# Patient Record
Sex: FEMALE | Race: WHITE | NOT HISPANIC OR LATINO | Employment: FULL TIME | URBAN - METROPOLITAN AREA
[De-identification: names, ages, dates, MRNs, and addresses within clinical notes are randomized per-mention and may not be internally consistent; named-entity substitution may affect disease eponyms.]

---

## 2018-07-19 ENCOUNTER — HOSPITAL ENCOUNTER (EMERGENCY)
Facility: HOSPITAL | Age: 63
Discharge: HOME | End: 2018-07-19
Attending: EMERGENCY MEDICINE
Payer: COMMERCIAL

## 2018-07-19 VITALS
SYSTOLIC BLOOD PRESSURE: 137 MMHG | HEIGHT: 67 IN | TEMPERATURE: 98 F | OXYGEN SATURATION: 94 % | RESPIRATION RATE: 18 BRPM | DIASTOLIC BLOOD PRESSURE: 71 MMHG | WEIGHT: 180 LBS | HEART RATE: 84 BPM | BODY MASS INDEX: 28.25 KG/M2

## 2018-07-19 DIAGNOSIS — R10.9 LEFT FLANK PAIN: Primary | ICD-10-CM

## 2018-07-19 LAB
BACTERIA #/AREA URNS HPF: NORMAL /HPF
BILIRUB UR QL STRIP: NEGATIVE
CLARITY UR: ABNORMAL
COLOR UR: YELLOW
GLUCOSE UR QL STRIP: NEGATIVE
HGB UR QL STRIP: NEGATIVE
KETONES UR QL STRIP: NEGATIVE
LEUKOCYTE ESTERASE UR QL STRIP: ABNORMAL
MICROSCOPIC COMMENT: NORMAL
NITRITE UR QL STRIP: NEGATIVE
PH UR STRIP: 5 [PH] (ref 5–8)
PROT UR QL STRIP: NEGATIVE
RBC #/AREA URNS HPF: 1 /HPF (ref 0–4)
SP GR UR STRIP: 1.02 (ref 1–1.03)
SQUAMOUS #/AREA URNS HPF: 4 /HPF
URN SPEC COLLECT METH UR: ABNORMAL
UROBILINOGEN UR STRIP-ACNC: NEGATIVE EU/DL
WBC #/AREA URNS HPF: 5 /HPF (ref 0–5)

## 2018-07-19 PROCEDURE — 63600175 PHARM REV CODE 636 W HCPCS: Performed by: NURSE PRACTITIONER

## 2018-07-19 PROCEDURE — 99284 EMERGENCY DEPT VISIT MOD MDM: CPT | Mod: 25

## 2018-07-19 PROCEDURE — 81000 URINALYSIS NONAUTO W/SCOPE: CPT

## 2018-07-19 PROCEDURE — 96372 THER/PROPH/DIAG INJ SC/IM: CPT

## 2018-07-19 PROCEDURE — 87086 URINE CULTURE/COLONY COUNT: CPT

## 2018-07-19 PROCEDURE — 25000003 PHARM REV CODE 250: Performed by: NURSE PRACTITIONER

## 2018-07-19 RX ORDER — CEPHALEXIN 250 MG/1
500 CAPSULE ORAL
Status: DISCONTINUED | OUTPATIENT
Start: 2018-07-19 | End: 2018-07-19 | Stop reason: HOSPADM

## 2018-07-19 RX ORDER — CYCLOBENZAPRINE HCL 10 MG
10 TABLET ORAL EVERY 8 HOURS
Qty: 30 TABLET | Refills: 0 | Status: SHIPPED | OUTPATIENT
Start: 2018-07-19 | End: 2018-07-24

## 2018-07-19 RX ORDER — IBUPROFEN 600 MG/1
600 TABLET ORAL EVERY 6 HOURS PRN
Qty: 30 TABLET | Refills: 0 | Status: SHIPPED | OUTPATIENT
Start: 2018-07-19 | End: 2019-05-20

## 2018-07-19 RX ORDER — HYDROCODONE BITARTRATE AND ACETAMINOPHEN 5; 325 MG/1; MG/1
1 TABLET ORAL
Status: COMPLETED | OUTPATIENT
Start: 2018-07-19 | End: 2018-07-19

## 2018-07-19 RX ORDER — CEPHALEXIN 500 MG/1
500 CAPSULE ORAL EVERY 8 HOURS
Qty: 21 CAPSULE | Refills: 0 | Status: SHIPPED | OUTPATIENT
Start: 2018-07-19 | End: 2018-07-26

## 2018-07-19 RX ORDER — KETOROLAC TROMETHAMINE 30 MG/ML
15 INJECTION, SOLUTION INTRAMUSCULAR; INTRAVENOUS
Status: COMPLETED | OUTPATIENT
Start: 2018-07-19 | End: 2018-07-19

## 2018-07-19 RX ORDER — CYCLOBENZAPRINE HCL 10 MG
10 TABLET ORAL
Status: COMPLETED | OUTPATIENT
Start: 2018-07-19 | End: 2018-07-19

## 2018-07-19 RX ADMIN — CYCLOBENZAPRINE HYDROCHLORIDE 10 MG: 10 TABLET, FILM COATED ORAL at 06:07

## 2018-07-19 RX ADMIN — HYDROCODONE BITARTRATE AND ACETAMINOPHEN 1 TABLET: 5; 325 TABLET ORAL at 06:07

## 2018-07-19 RX ADMIN — KETOROLAC TROMETHAMINE 15 MG: 30 INJECTION, SOLUTION INTRAMUSCULAR at 06:07

## 2018-07-19 NOTE — ED PROVIDER NOTES
Encounter Date: 7/19/2018  SORT:   64 y/o female presents for L flank pain with associated nausea since 1530 today. Denies history of similar episodes. Denies dysuria, hematuria, urinary urgency, vomiting, fever. Initial orders placed. Justine Agarwal PA-C   SCRIBE #1 NOTE: IIsrrael, am scribing for, and in the presence of,  Lul Gillette NP. I have scribed the following portions of the note - Other sections scribed: HPI and ROS.       History     Chief Complaint   Patient presents with    Back Pain     left lower back pain started at 3pm; with nausea; denies dysuria     Chief Complaint: Back Pain    HPI: This 63 y.o.female with no known PMHx presents to the ED c/o acute onset of left flank pain. Symptoms began at 3:30 pm today. Pain is constant, severe, and has worsened since onset. There's associated nausea and 1 episode of emesis. No attempted treatment. She denies fever, chills, dysuria or hematuria. No injury or trauma.       The history is provided by the patient. No  was used.     Review of patient's allergies indicates:  Allergies not on file  History reviewed. No pertinent past medical history.  History reviewed. No pertinent surgical history.  History reviewed. No pertinent family history.  Social History   Substance Use Topics    Smoking status: Never Smoker    Smokeless tobacco: Never Used    Alcohol use 0.6 oz/week     1 Cans of beer per week      Comment: occassionaly     Review of Systems   Constitutional: Negative for chills and fever.   HENT: Negative for ear pain and sore throat.    Eyes: Negative for pain.   Gastrointestinal: Positive for nausea and vomiting. Negative for abdominal pain and diarrhea.   Genitourinary: Negative for dysuria and hematuria.   Musculoskeletal: Positive for back pain. Negative for myalgias (arm or leg pain).   Skin: Negative for rash.   Neurological: Negative for headaches.   All other systems reviewed and are negative.      Physical  Exam     Initial Vitals [07/19/18 1714]   BP Pulse Resp Temp SpO2   (!) 179/83 74 20 97.7 °F (36.5 °C) 99 %      MAP       --         Physical Exam    Nursing note and vitals reviewed.  Constitutional: She appears well-developed and well-nourished. No distress.   HENT:   Head: Atraumatic.   Eyes: Conjunctivae are normal.   Neck: Neck supple.   Cardiovascular: Normal rate, regular rhythm and normal heart sounds.   No murmur heard.  Pulmonary/Chest: Breath sounds normal. No respiratory distress. She has no wheezes. She has no rhonchi. She has no rales.   Abdominal: Soft. Bowel sounds are normal. She exhibits no distension. There is CVA tenderness (Left).   Neurological: She is alert and oriented to person, place, and time. No sensory deficit.   Skin: Skin is warm and dry. No rash noted.         ED Course   Procedures  Labs Reviewed   URINALYSIS - Abnormal; Notable for the following:        Result Value    Appearance, UA Hazy (*)     Leukocytes, UA 3+ (*)     All other components within normal limits   CULTURE, URINE   URINALYSIS MICROSCOPIC          Imaging Results          CT Renal Stone Study ABD Pelvis WO (Final result)  Result time 07/19/18 18:48:11    Final result by Gavin Barajas MD (07/19/18 18:48:11)                 Impression:      Mild left-sided perinephric stranding with mild prominence of the left ureter; although no obstructing stone is seen this may represent the sequelae of a recently passed stone or infectious or inflammatory process; clinical correlation is advised      Electronically signed by: Gavin Barajas MD  Date:    07/19/2018  Time:    18:48             Narrative:    EXAMINATION:  CT RENAL STONE STUDY ABD PELVIS WO    CLINICAL HISTORY:  Flank pain, stone disease suspected;    TECHNIQUE:  Low dose axial images, sagittal and coronal reformations were obtained from the lung bases to the pubic symphysis.  Contrast was not administered.    COMPARISON:  None    FINDINGS:  Heart: Normal in  size. No pericardial effusion.    Lung Bases: Well aerated, without consolidation or pleural fluid.    Liver: There is diffuse hepatic steatosis    Gallbladder: No calcified gallstones.    Bile Ducts: No evidence of dilated ducts.    Pancreas: No mass or peripancreatic fat stranding.    Spleen: Unremarkable.    Adrenals: Unremarkable.    Kidneys/ Ureters: There is no evidence of an renal calculi.  There is mild perinephric stranding on the left.  There is mild prominence of the left-sided ureter bed no evidence of hydronephrosis or hydroureter on the right.    Bladder: No evidence of wall thickening.    Reproductive organs: Unremarkable.    GI Tract/Mesentery: No evidence of bowel obstruction or inflammation.    Peritoneal Space: No ascites. No free air.    Retroperitoneum: No significant adenopathy.    Abdominal wall: Unremarkable.    Vasculature: No significant atherosclerosis or aneurysm.    Bones: No acute fracture.                                 Medical Decision Making:   ED Management:  63-year-old female presents to the ED for evaluation of left flank pain that began approximately 2-3 hours prior to arrival.    denies any known injury.  Denies fever, chills, dysuria or hematuria.  No treatment prior to arrival.  Denies history of similar symptoms.  On exam patient appears uncomfortable but nontoxic appearing.  Abdomen is soft and nondistended.  She does have left CVA tenderness.  UA remarkable for 3+ leukocytes with 5 WBCs and 1 RBC.  CT of abdomen and pelvis without contrast remarkable for left perinephric stranding and mild prominence of the left ureter.  No evidence of kidney stones.  The radiologist noted this can be seen with recently passed stone or infection.  Patient treated with Toradol, Flexeril and Norco with improvement in presenting symptoms.  She is discharged with prescription for ibuprofen, Flexeril and Keflex and instructed to follow-up with her PCP for further evaluation of presenting  symptoms.  She is encouraged to return to the ED with any new or worsening symptoms or any concerns.              Scribe Attestation:   Scribe #1: I performed the above scribed service and the documentation accurately describes the services I performed. I attest to the accuracy of the note.    Attending Attestation:           Physician Attestation for Scribe:  Physician Attestation Statement for Scribe #1: I, Lul Gillette NP, reviewed documentation, as scribed by Isrrael Higginbotham in my presence, and it is both accurate and complete.                    Clinical Impression:   The encounter diagnosis was Left flank pain.                             Lul Gillette NP  07/19/18 2110

## 2018-07-19 NOTE — ED TRIAGE NOTES
"Pt states " My back hurting lower left side and now nausea and vomiting."  Back been hurting since 1530 and just now I started throwing up 1725.  The pain to back 8/10 sharp and achy.  "

## 2018-07-20 NOTE — DISCHARGE INSTRUCTIONS
Take medication as prescribed. Drink plenty of water. Follow up with your primary care provider for re-evaluation of presenting symptoms. Return to the ED with any new or worsening symptoms or any concerns.

## 2018-07-21 LAB — BACTERIA UR CULT: NORMAL

## 2019-05-20 ENCOUNTER — ANESTHESIA EVENT (OUTPATIENT)
Dept: SURGERY | Facility: HOSPITAL | Age: 64
DRG: 338 | End: 2019-05-20
Payer: COMMERCIAL

## 2019-05-20 ENCOUNTER — HOSPITAL ENCOUNTER (INPATIENT)
Facility: HOSPITAL | Age: 64
LOS: 8 days | Discharge: HOME | DRG: 338 | End: 2019-05-29
Attending: EMERGENCY MEDICINE | Admitting: SURGERY
Payer: COMMERCIAL

## 2019-05-20 ENCOUNTER — ANESTHESIA (OUTPATIENT)
Dept: SURGERY | Facility: HOSPITAL | Age: 64
DRG: 338 | End: 2019-05-20
Payer: COMMERCIAL

## 2019-05-20 DIAGNOSIS — R18.8 PELVIC FLUID COLLECTION: Primary | ICD-10-CM

## 2019-05-20 DIAGNOSIS — C18.1 ADENOCARCINOMA OF APPENDIX: ICD-10-CM

## 2019-05-20 DIAGNOSIS — R10.84 GENERALIZED ABDOMINAL PAIN: ICD-10-CM

## 2019-05-20 DIAGNOSIS — Z01.810 PREOP CARDIOVASCULAR EXAM: ICD-10-CM

## 2019-05-20 DIAGNOSIS — R50.9 FEVER, UNSPECIFIED FEVER CAUSE: ICD-10-CM

## 2019-05-20 DIAGNOSIS — K37 APPENDICITIS, UNSPECIFIED APPENDICITIS TYPE: ICD-10-CM

## 2019-05-20 DIAGNOSIS — K35.32 ACUTE PERFORATED APPENDICITIS: ICD-10-CM

## 2019-05-20 LAB
ABO + RH BLD: NORMAL
ALBUMIN SERPL BCP-MCNC: 3.7 G/DL (ref 3.5–5.2)
ALP SERPL-CCNC: 68 U/L (ref 55–135)
ALT SERPL W/O P-5'-P-CCNC: 30 U/L (ref 10–44)
ANION GAP SERPL CALC-SCNC: 8 MMOL/L (ref 8–16)
APTT BLDCRRT: 25.7 SEC (ref 21–32)
AST SERPL-CCNC: 27 U/L (ref 10–40)
BACTERIA #/AREA URNS HPF: ABNORMAL /HPF
BASOPHILS # BLD AUTO: 0.01 K/UL (ref 0–0.2)
BASOPHILS NFR BLD: 0.1 % (ref 0–1.9)
BILIRUB SERPL-MCNC: 0.3 MG/DL (ref 0.1–1)
BILIRUB UR QL STRIP: NEGATIVE
BLD GP AB SCN CELLS X3 SERPL QL: NORMAL
BUN SERPL-MCNC: 18 MG/DL (ref 8–23)
CALCIUM SERPL-MCNC: 10 MG/DL (ref 8.7–10.5)
CHLORIDE SERPL-SCNC: 106 MMOL/L (ref 95–110)
CLARITY UR: ABNORMAL
CO2 SERPL-SCNC: 25 MMOL/L (ref 23–29)
COLOR UR: YELLOW
CREAT SERPL-MCNC: 0.9 MG/DL (ref 0.5–1.4)
DIFFERENTIAL METHOD: NORMAL
EOSINOPHIL # BLD AUTO: 0.2 K/UL (ref 0–0.5)
EOSINOPHIL NFR BLD: 2.1 % (ref 0–8)
ERYTHROCYTE [DISTWIDTH] IN BLOOD BY AUTOMATED COUNT: 13.6 % (ref 11.5–14.5)
EST. GFR  (AFRICAN AMERICAN): >60 ML/MIN/1.73 M^2
EST. GFR  (NON AFRICAN AMERICAN): >60 ML/MIN/1.73 M^2
GLUCOSE SERPL-MCNC: 150 MG/DL (ref 70–110)
GLUCOSE UR QL STRIP: NEGATIVE
HCT VFR BLD AUTO: 42.4 % (ref 37–48.5)
HGB BLD-MCNC: 13.8 G/DL (ref 12–16)
HGB UR QL STRIP: NEGATIVE
INR PPP: 0.9 (ref 0.8–1.2)
KETONES UR QL STRIP: NEGATIVE
LACTATE SERPL-SCNC: 1.9 MMOL/L (ref 0.5–2.2)
LACTATE SERPL-SCNC: 3.5 MMOL/L (ref 0.5–2.2)
LEUKOCYTE ESTERASE UR QL STRIP: ABNORMAL
LIPASE SERPL-CCNC: 21 U/L (ref 4–60)
LYMPHOCYTES # BLD AUTO: 2.3 K/UL (ref 1–4.8)
LYMPHOCYTES NFR BLD: 22.5 % (ref 18–48)
MCH RBC QN AUTO: 29.4 PG (ref 27–31)
MCHC RBC AUTO-ENTMCNC: 32.5 G/DL (ref 32–36)
MCV RBC AUTO: 90 FL (ref 82–98)
MICROSCOPIC COMMENT: ABNORMAL
MONOCYTES # BLD AUTO: 0.5 K/UL (ref 0.3–1)
MONOCYTES NFR BLD: 4.7 % (ref 4–15)
NEUTROPHILS # BLD AUTO: 7.2 K/UL (ref 1.8–7.7)
NEUTROPHILS NFR BLD: 70.6 % (ref 38–73)
NITRITE UR QL STRIP: NEGATIVE
PH UR STRIP: 6 [PH] (ref 5–8)
PLATELET # BLD AUTO: 218 K/UL (ref 150–350)
PMV BLD AUTO: 10.9 FL (ref 9.2–12.9)
POTASSIUM SERPL-SCNC: 4.4 MMOL/L (ref 3.5–5.1)
PROT SERPL-MCNC: 6.9 G/DL (ref 6–8.4)
PROT UR QL STRIP: NEGATIVE
PROTHROMBIN TIME: 9.8 SEC (ref 9–12.5)
RBC # BLD AUTO: 4.69 M/UL (ref 4–5.4)
RBC #/AREA URNS HPF: 1 /HPF (ref 0–4)
SODIUM SERPL-SCNC: 139 MMOL/L (ref 136–145)
SP GR UR STRIP: 1.02 (ref 1–1.03)
SQUAMOUS #/AREA URNS HPF: 6 /HPF
URN SPEC COLLECT METH UR: ABNORMAL
UROBILINOGEN UR STRIP-ACNC: NEGATIVE EU/DL
WBC # BLD AUTO: 10.24 K/UL (ref 3.9–12.7)
WBC #/AREA URNS HPF: 13 /HPF (ref 0–5)

## 2019-05-20 PROCEDURE — 99285 EMERGENCY DEPT VISIT HI MDM: CPT | Mod: 25

## 2019-05-20 PROCEDURE — 36415 COLL VENOUS BLD VENIPUNCTURE: CPT

## 2019-05-20 PROCEDURE — S0030 INJECTION, METRONIDAZOLE: HCPCS | Performed by: SURGERY

## 2019-05-20 PROCEDURE — 93010 ELECTROCARDIOGRAM REPORT: CPT | Mod: ,,, | Performed by: INTERNAL MEDICINE

## 2019-05-20 PROCEDURE — 25000003 PHARM REV CODE 250: Performed by: SURGERY

## 2019-05-20 PROCEDURE — 93010 EKG 12-LEAD: ICD-10-PCS | Mod: ,,, | Performed by: INTERNAL MEDICINE

## 2019-05-20 PROCEDURE — 37000009 HC ANESTHESIA EA ADD 15 MINS: Performed by: SURGERY

## 2019-05-20 PROCEDURE — 63600175 PHARM REV CODE 636 W HCPCS: Performed by: EMERGENCY MEDICINE

## 2019-05-20 PROCEDURE — D9220A PRA ANESTHESIA: ICD-10-PCS | Mod: ANES,,, | Performed by: ANESTHESIOLOGY

## 2019-05-20 PROCEDURE — S0030 INJECTION, METRONIDAZOLE: HCPCS | Performed by: EMERGENCY MEDICINE

## 2019-05-20 PROCEDURE — 85025 COMPLETE CBC W/AUTO DIFF WBC: CPT

## 2019-05-20 PROCEDURE — 25000003 PHARM REV CODE 250: Performed by: EMERGENCY MEDICINE

## 2019-05-20 PROCEDURE — 27201423 OPTIME MED/SURG SUP & DEVICES STERILE SUPPLY: Performed by: SURGERY

## 2019-05-20 PROCEDURE — 25000003 PHARM REV CODE 250: Performed by: NURSE ANESTHETIST, CERTIFIED REGISTERED

## 2019-05-20 PROCEDURE — 63600175 PHARM REV CODE 636 W HCPCS: Performed by: NURSE ANESTHETIST, CERTIFIED REGISTERED

## 2019-05-20 PROCEDURE — D9220A PRA ANESTHESIA: ICD-10-PCS | Mod: CRNA,,, | Performed by: NURSE ANESTHETIST, CERTIFIED REGISTERED

## 2019-05-20 PROCEDURE — 83690 ASSAY OF LIPASE: CPT

## 2019-05-20 PROCEDURE — D9220A PRA ANESTHESIA: Mod: ANES,,, | Performed by: ANESTHESIOLOGY

## 2019-05-20 PROCEDURE — 87040 BLOOD CULTURE FOR BACTERIA: CPT | Mod: 59

## 2019-05-20 PROCEDURE — 85610 PROTHROMBIN TIME: CPT

## 2019-05-20 PROCEDURE — 87086 URINE CULTURE/COLONY COUNT: CPT

## 2019-05-20 PROCEDURE — 36000708 HC OR TIME LEV III 1ST 15 MIN: Performed by: SURGERY

## 2019-05-20 PROCEDURE — 81000 URINALYSIS NONAUTO W/SCOPE: CPT

## 2019-05-20 PROCEDURE — 96375 TX/PRO/DX INJ NEW DRUG ADDON: CPT

## 2019-05-20 PROCEDURE — 94761 N-INVAS EAR/PLS OXIMETRY MLT: CPT

## 2019-05-20 PROCEDURE — 83605 ASSAY OF LACTIC ACID: CPT

## 2019-05-20 PROCEDURE — 36000709 HC OR TIME LEV III EA ADD 15 MIN: Performed by: SURGERY

## 2019-05-20 PROCEDURE — D9220A PRA ANESTHESIA: Mod: CRNA,,, | Performed by: NURSE ANESTHETIST, CERTIFIED REGISTERED

## 2019-05-20 PROCEDURE — 37000008 HC ANESTHESIA 1ST 15 MINUTES: Performed by: SURGERY

## 2019-05-20 PROCEDURE — 88307 TISSUE SPECIMEN TO PATHOLOGY - SURGERY: ICD-10-PCS | Mod: 26,,, | Performed by: PATHOLOGY

## 2019-05-20 PROCEDURE — 85730 THROMBOPLASTIN TIME PARTIAL: CPT

## 2019-05-20 PROCEDURE — 88307 TISSUE EXAM BY PATHOLOGIST: CPT | Performed by: PATHOLOGY

## 2019-05-20 PROCEDURE — 93005 ELECTROCARDIOGRAM TRACING: CPT

## 2019-05-20 PROCEDURE — 96374 THER/PROPH/DIAG INJ IV PUSH: CPT

## 2019-05-20 PROCEDURE — 86850 RBC ANTIBODY SCREEN: CPT

## 2019-05-20 PROCEDURE — 71000033 HC RECOVERY, INTIAL HOUR: Performed by: SURGERY

## 2019-05-20 PROCEDURE — 63600175 PHARM REV CODE 636 W HCPCS: Performed by: SURGERY

## 2019-05-20 PROCEDURE — 80053 COMPREHEN METABOLIC PANEL: CPT

## 2019-05-20 PROCEDURE — 71000039 HC RECOVERY, EACH ADD'L HOUR: Performed by: SURGERY

## 2019-05-20 PROCEDURE — 88307 TISSUE EXAM BY PATHOLOGIST: CPT | Mod: 26,,, | Performed by: PATHOLOGY

## 2019-05-20 RX ORDER — SODIUM CHLORIDE 9 MG/ML
INJECTION, SOLUTION INTRAVENOUS CONTINUOUS PRN
Status: DISCONTINUED | OUTPATIENT
Start: 2019-05-20 | End: 2019-05-20

## 2019-05-20 RX ORDER — FAMOTIDINE 20 MG/1
20 TABLET, FILM COATED ORAL 2 TIMES DAILY
Status: DISCONTINUED | OUTPATIENT
Start: 2019-05-20 | End: 2019-05-29 | Stop reason: HOSPADM

## 2019-05-20 RX ORDER — ACETAMINOPHEN 650 MG/1
650 SUPPOSITORY RECTAL
Status: COMPLETED | OUTPATIENT
Start: 2019-05-20 | End: 2019-05-20

## 2019-05-20 RX ORDER — LIDOCAINE HCL/PF 100 MG/5ML
SYRINGE (ML) INTRAVENOUS
Status: DISCONTINUED | OUTPATIENT
Start: 2019-05-20 | End: 2019-05-20

## 2019-05-20 RX ORDER — NEOSTIGMINE METHYLSULFATE 1 MG/ML
INJECTION, SOLUTION INTRAVENOUS
Status: DISCONTINUED | OUTPATIENT
Start: 2019-05-20 | End: 2019-05-20

## 2019-05-20 RX ORDER — PROPOFOL 10 MG/ML
VIAL (ML) INTRAVENOUS
Status: DISCONTINUED | OUTPATIENT
Start: 2019-05-20 | End: 2019-05-20

## 2019-05-20 RX ORDER — HYDROCODONE BITARTRATE AND ACETAMINOPHEN 10; 325 MG/1; MG/1
1 TABLET ORAL EVERY 4 HOURS PRN
Status: DISCONTINUED | OUTPATIENT
Start: 2019-05-20 | End: 2019-05-29 | Stop reason: HOSPADM

## 2019-05-20 RX ORDER — IBUPROFEN 400 MG/1
400 TABLET ORAL EVERY 6 HOURS PRN
Status: DISCONTINUED | OUTPATIENT
Start: 2019-05-20 | End: 2019-05-29 | Stop reason: HOSPADM

## 2019-05-20 RX ORDER — HYDROMORPHONE HYDROCHLORIDE 2 MG/ML
1 INJECTION, SOLUTION INTRAMUSCULAR; INTRAVENOUS; SUBCUTANEOUS
Status: COMPLETED | OUTPATIENT
Start: 2019-05-20 | End: 2019-05-20

## 2019-05-20 RX ORDER — CIPROFLOXACIN 2 MG/ML
400 INJECTION, SOLUTION INTRAVENOUS
Status: DISCONTINUED | OUTPATIENT
Start: 2019-05-20 | End: 2019-05-26

## 2019-05-20 RX ORDER — HYDROMORPHONE HYDROCHLORIDE 2 MG/ML
1 INJECTION, SOLUTION INTRAMUSCULAR; INTRAVENOUS; SUBCUTANEOUS
Status: DISCONTINUED | OUTPATIENT
Start: 2019-05-20 | End: 2019-05-29 | Stop reason: HOSPADM

## 2019-05-20 RX ORDER — SODIUM CHLORIDE 0.9 % (FLUSH) 0.9 %
3 SYRINGE (ML) INJECTION
Status: DISCONTINUED | OUTPATIENT
Start: 2019-05-20 | End: 2019-05-20 | Stop reason: HOSPADM

## 2019-05-20 RX ORDER — SODIUM CHLORIDE, SODIUM LACTATE, POTASSIUM CHLORIDE, CALCIUM CHLORIDE 600; 310; 30; 20 MG/100ML; MG/100ML; MG/100ML; MG/100ML
INJECTION, SOLUTION INTRAVENOUS CONTINUOUS
Status: DISCONTINUED | OUTPATIENT
Start: 2019-05-20 | End: 2019-05-22

## 2019-05-20 RX ORDER — METRONIDAZOLE 500 MG/100ML
500 INJECTION, SOLUTION INTRAVENOUS
Status: COMPLETED | OUTPATIENT
Start: 2019-05-20 | End: 2019-05-20

## 2019-05-20 RX ORDER — PHENYLEPHRINE HYDROCHLORIDE 10 MG/ML
INJECTION INTRAVENOUS
Status: DISCONTINUED | OUTPATIENT
Start: 2019-05-20 | End: 2019-05-20

## 2019-05-20 RX ORDER — SODIUM CHLORIDE, SODIUM LACTATE, POTASSIUM CHLORIDE, CALCIUM CHLORIDE 600; 310; 30; 20 MG/100ML; MG/100ML; MG/100ML; MG/100ML
INJECTION, SOLUTION INTRAVENOUS CONTINUOUS PRN
Status: DISCONTINUED | OUTPATIENT
Start: 2019-05-20 | End: 2019-05-20

## 2019-05-20 RX ORDER — ONDANSETRON 2 MG/ML
8 INJECTION INTRAMUSCULAR; INTRAVENOUS
Status: COMPLETED | OUTPATIENT
Start: 2019-05-20 | End: 2019-05-20

## 2019-05-20 RX ORDER — GLYCOPYRROLATE 0.2 MG/ML
INJECTION INTRAMUSCULAR; INTRAVENOUS
Status: DISCONTINUED | OUTPATIENT
Start: 2019-05-20 | End: 2019-05-20

## 2019-05-20 RX ORDER — CIPROFLOXACIN 2 MG/ML
400 INJECTION, SOLUTION INTRAVENOUS
Status: COMPLETED | OUTPATIENT
Start: 2019-05-20 | End: 2019-05-20

## 2019-05-20 RX ORDER — METRONIDAZOLE 500 MG/100ML
500 INJECTION, SOLUTION INTRAVENOUS
Status: DISCONTINUED | OUTPATIENT
Start: 2019-05-20 | End: 2019-05-26

## 2019-05-20 RX ORDER — MIDAZOLAM HYDROCHLORIDE 1 MG/ML
INJECTION, SOLUTION INTRAMUSCULAR; INTRAVENOUS
Status: DISCONTINUED | OUTPATIENT
Start: 2019-05-20 | End: 2019-05-20

## 2019-05-20 RX ORDER — LORAZEPAM 2 MG/ML
0.25 INJECTION INTRAMUSCULAR ONCE AS NEEDED
Status: DISCONTINUED | OUTPATIENT
Start: 2019-05-20 | End: 2019-05-20 | Stop reason: HOSPADM

## 2019-05-20 RX ORDER — ENOXAPARIN SODIUM 100 MG/ML
40 INJECTION SUBCUTANEOUS EVERY 24 HOURS
Status: DISCONTINUED | OUTPATIENT
Start: 2019-05-21 | End: 2019-05-29 | Stop reason: HOSPADM

## 2019-05-20 RX ORDER — FENTANYL CITRATE 50 UG/ML
INJECTION, SOLUTION INTRAMUSCULAR; INTRAVENOUS
Status: DISCONTINUED | OUTPATIENT
Start: 2019-05-20 | End: 2019-05-20

## 2019-05-20 RX ORDER — SUCCINYLCHOLINE CHLORIDE 20 MG/ML
INJECTION INTRAMUSCULAR; INTRAVENOUS
Status: DISCONTINUED | OUTPATIENT
Start: 2019-05-20 | End: 2019-05-20

## 2019-05-20 RX ORDER — ROCURONIUM BROMIDE 10 MG/ML
INJECTION, SOLUTION INTRAVENOUS
Status: DISCONTINUED | OUTPATIENT
Start: 2019-05-20 | End: 2019-05-20

## 2019-05-20 RX ORDER — BUPIVACAINE HYDROCHLORIDE AND EPINEPHRINE 2.5; 5 MG/ML; UG/ML
INJECTION, SOLUTION EPIDURAL; INFILTRATION; INTRACAUDAL; PERINEURAL
Status: DISCONTINUED | OUTPATIENT
Start: 2019-05-20 | End: 2019-05-20 | Stop reason: HOSPADM

## 2019-05-20 RX ORDER — MORPHINE SULFATE 10 MG/ML
2 INJECTION INTRAMUSCULAR; INTRAVENOUS; SUBCUTANEOUS EVERY 5 MIN PRN
Status: DISCONTINUED | OUTPATIENT
Start: 2019-05-20 | End: 2019-05-20 | Stop reason: HOSPADM

## 2019-05-20 RX ADMIN — HYDROMORPHONE HYDROCHLORIDE 1 MG: 2 INJECTION, SOLUTION INTRAMUSCULAR; INTRAVENOUS; SUBCUTANEOUS at 06:05

## 2019-05-20 RX ADMIN — PHENYLEPHRINE HYDROCHLORIDE 100 MCG: 10 INJECTION INTRAVENOUS at 09:05

## 2019-05-20 RX ADMIN — ROCURONIUM BROMIDE 5 MG: 10 INJECTION, SOLUTION INTRAVENOUS at 09:05

## 2019-05-20 RX ADMIN — ROCURONIUM BROMIDE 10 MG: 10 INJECTION, SOLUTION INTRAVENOUS at 09:05

## 2019-05-20 RX ADMIN — CIPROFLOXACIN 400 MG: 2 INJECTION, SOLUTION INTRAVENOUS at 07:05

## 2019-05-20 RX ADMIN — FAMOTIDINE 20 MG: 20 TABLET ORAL at 08:05

## 2019-05-20 RX ADMIN — ONDANSETRON 8 MG: 2 INJECTION INTRAMUSCULAR; INTRAVENOUS at 06:05

## 2019-05-20 RX ADMIN — FENTANYL CITRATE 100 MCG: 50 INJECTION INTRAMUSCULAR; INTRAVENOUS at 09:05

## 2019-05-20 RX ADMIN — PROPOFOL 50 MG: 10 INJECTION, EMULSION INTRAVENOUS at 09:05

## 2019-05-20 RX ADMIN — METRONIDAZOLE 500 MG: 500 INJECTION, SOLUTION INTRAVENOUS at 09:05

## 2019-05-20 RX ADMIN — SODIUM CHLORIDE, SODIUM LACTATE, POTASSIUM CHLORIDE, AND CALCIUM CHLORIDE: .6; .31; .03; .02 INJECTION, SOLUTION INTRAVENOUS at 02:05

## 2019-05-20 RX ADMIN — ROCURONIUM BROMIDE 20 MG: 10 INJECTION, SOLUTION INTRAVENOUS at 09:05

## 2019-05-20 RX ADMIN — SODIUM CHLORIDE: 0.9 INJECTION, SOLUTION INTRAVENOUS at 08:05

## 2019-05-20 RX ADMIN — PHENYLEPHRINE HYDROCHLORIDE 200 MCG: 10 INJECTION INTRAVENOUS at 09:05

## 2019-05-20 RX ADMIN — METRONIDAZOLE 500 MG: 500 INJECTION, SOLUTION INTRAVENOUS at 05:05

## 2019-05-20 RX ADMIN — ACETAMINOPHEN 650 MG: 650 SUPPOSITORY RECTAL at 07:05

## 2019-05-20 RX ADMIN — MIDAZOLAM HYDROCHLORIDE 2 MG: 1 INJECTION, SOLUTION INTRAMUSCULAR; INTRAVENOUS at 09:05

## 2019-05-20 RX ADMIN — NEOSTIGMINE METHYLSULFATE 5 MG: 1 INJECTION INTRAVENOUS at 10:05

## 2019-05-20 RX ADMIN — SUCCINYLCHOLINE CHLORIDE 140 MG: 20 INJECTION, SOLUTION INTRAMUSCULAR; INTRAVENOUS at 09:05

## 2019-05-20 RX ADMIN — SODIUM CHLORIDE, SODIUM LACTATE, POTASSIUM CHLORIDE, AND CALCIUM CHLORIDE: .6; .31; .03; .02 INJECTION, SOLUTION INTRAVENOUS at 10:05

## 2019-05-20 RX ADMIN — GLYCOPYRROLATE 0.5 MG: 0.2 INJECTION, SOLUTION INTRAMUSCULAR; INTRAVENOUS at 10:05

## 2019-05-20 RX ADMIN — CIPROFLOXACIN 400 MG: 2 INJECTION, SOLUTION INTRAVENOUS at 08:05

## 2019-05-20 RX ADMIN — PROPOFOL 150 MG: 10 INJECTION, EMULSION INTRAVENOUS at 09:05

## 2019-05-20 RX ADMIN — LIDOCAINE HYDROCHLORIDE 100 MG: 20 INJECTION, SOLUTION INTRAVENOUS at 09:05

## 2019-05-20 RX ADMIN — SODIUM CHLORIDE 2517 ML: 0.9 INJECTION, SOLUTION INTRAVENOUS at 07:05

## 2019-05-20 NOTE — TRANSFER OF CARE
"Anesthesia Transfer of Care Note    Patient: Ny Romeo    Procedure(s) Performed: Procedure(s) (LRB):  APPENDECTOMY, LAPAROSCOPIC (N/A)    Patient location: PACU    Anesthesia Type: general    Transport from OR: Transported from OR on room air with adequate spontaneous ventilation    Post pain: adequate analgesia    Post assessment: no apparent anesthetic complications    Post vital signs: stable    Level of consciousness: awake, alert and oriented    Nausea/Vomiting: no nausea/vomiting    Complications: none    Transfer of care protocol was followed      Last vitals:   Visit Vitals  BP (!) 110/53   Pulse 81   Temp 37 °C (98.6 °F)   Resp 16   Ht 5' 7" (1.702 m)   Wt 83.9 kg (185 lb)   SpO2 99%   Breastfeeding? No   BMI 28.98 kg/m²     "

## 2019-05-20 NOTE — H&P
HISTORY OF PRESENT ILLNESS:  This is a 64-year-old female who was awakened at   3:00 a.m. today with generalized abdominal pain, which is localized to the right   lower quadrant and slowly worsened.  She has also developed nausea and   vomiting.  She has never had symptoms like this in the past.  Previous to this,   she was tolerating oral intake and having regular brown bowel movements.  Her   last bowel movement was yesterday.  She denies fever or chills.  She denies   dysuria.  She denies exertional angina or shortness of breath.  She has never   had a colonoscopy.    PAST MEDICAL HISTORY:  Kidney stones.    PAST SURGICAL HISTORY:   section.    ALLERGIES:  PENICILLIN.    SOCIAL HISTORY:  She is a former smoker-she quit about 2 years ago and she   socially consumes alcoholic beverages.    HOME MEDICATIONS:  Tums p.r.n. indigestion.    PHYSICAL EXAMINATION:  VITAL SIGNS:  Blood pressure 157/70, respiratory rate 20, pulse 105, temperature   101.  GENERAL:  Alert and oriented x4.  HEENT:  No cervical or supraclavicular masses.  LUNGS:  Clear to auscultation bilaterally.  CARDIOVASCULAR:  Regular rate and rhythm.  AXILLA:  No masses bilaterally.  EXTREMITIES:  Palpable radial pulse bilaterally.  GENITOURINARY:  No inguinal hernia bilaterally.  ABDOMEN:  Obese, soft, positive bowel sounds, nondistended.  No masses.  No   hernia.  No peritoneal signs.  Moderate tenderness to palpation right lower   quadrant with guarding.  No hernia.    LABORATORY DATA:  White blood cell count 10, hematocrit 42, platelet count 218.    BUN 18, creatinine 0.9.  The patient had a chest x-ray, which revealed no   radiographic evidence of acute intrathoracic process.  She had a CT scan of her   abdomen and pelvis, which revealed a thickened blind ending tubular structure   that appears to be the appendix with mild adjacent fat stranding most likely due   to acute appendicitis.  There is mild wall thickening of the terminal ileum,    hepatomegaly and a hiatal hernia-I have reviewed the films.    ASSESSMENT AND PLAN:  Likely acute appendicitis-admit, n.p.o., intravenous   antibiotics, proceed with appendectomy today, attempt laparoscopically,   situation and procedure discussed with the patient at length, risks discussed   including, but not limited to, chance of open procedure and development of   intra-abdominal abscess requiring drainage, questions answered and easy to   understand terms.  The patient and  wished to proceed.      LISA/MESERET  dd: 05/20/2019 08:31:33 (CDT)  td: 05/20/2019 15:56:49 (CDT)  Doc ID   #[id number]  Job ID #[id number]    CC:

## 2019-05-20 NOTE — PROGRESS NOTES
gen surg op note  Pre op dx acute appendicitis  Post op dx perforated appendicitis  Proc lap appy/abd washout  anesth gen  ebl min  Findings dictated  Specimen appendix  charmaine [id number]

## 2019-05-20 NOTE — ED PROVIDER NOTES
Encounter Date: 2019       History     Chief Complaint   Patient presents with    Abdominal Pain     LLQ and LRQ  Abd pain for 2 hour some nausea no vomiting      64 y.o. female History reviewed. No pertinent past medical history. Started with generalized abd pain at 3am that has slowly moved to RLQ, endorses nausea but no vomiting/diarrhea.         Review of patient's allergies indicates:   Allergen Reactions    Penicillins      History reviewed. No pertinent past medical history.  History reviewed. No pertinent surgical history.  History reviewed. No pertinent family history.  Social History     Tobacco Use    Smoking status: Former Smoker     Types: Cigarettes     Last attempt to quit: 2017     Years since quittin.3    Smokeless tobacco: Never Used   Substance Use Topics    Alcohol use: Yes     Alcohol/week: 0.6 oz     Types: 1 Cans of beer per week     Comment: socially    Drug use: No     Review of Systems   Constitutional: Negative for fever.   HENT: Negative for sore throat.    Respiratory: Negative for shortness of breath.    Cardiovascular: Negative for chest pain.   Gastrointestinal: Positive for abdominal pain. Negative for nausea.   Genitourinary: Negative for dysuria.   Musculoskeletal: Negative for back pain.   Skin: Negative for rash.   Neurological: Negative for weakness.   Hematological: Does not bruise/bleed easily.   All other systems reviewed and are negative.      Physical Exam     Initial Vitals [19 0515]   BP Pulse Resp Temp SpO2   111/65 77 17 97.9 °F (36.6 °C) 95 %      MAP       --         Physical Exam    Nursing note and vitals reviewed.  Constitutional: She appears well-developed and well-nourished.   HENT:   Head: Normocephalic and atraumatic.   Eyes: Conjunctivae and EOM are normal. Pupils are equal, round, and reactive to light.   Neck: Normal range of motion.   Cardiovascular: Normal rate and regular rhythm.   Pulmonary/Chest: Breath sounds normal. No respiratory  distress.   Abdominal: She exhibits no distension.   Musculoskeletal: Normal range of motion.   Neurological: She is alert. No cranial nerve deficit. GCS score is 15. GCS eye subscore is 4. GCS verbal subscore is 5. GCS motor subscore is 6.   Skin: Skin is warm and dry.   Psychiatric: She has a normal mood and affect. Thought content normal.     exquisite RLQ ttp at McBurney's point no g/r    ED Course   Procedures  Labs Reviewed   CBC W/ AUTO DIFFERENTIAL   COMPREHENSIVE METABOLIC PANEL   LIPASE   URINALYSIS, REFLEX TO URINE CULTURE   PROTIME-INR   APTT   TYPE & SCREEN          Imaging Results    None          Medical Decision Making:   Initial Assessment:   Generalized abd pain  Moved and localizing to RLQ concerning for appendicitis. I have ordered preop labs, ct scan, abx, made pt NPO and will manage symptoms.  Differential Diagnosis:   Pt with acute appendicitis. I had ordered early goal directed therapy for sepsis and fluid bolus. Have spoken with Dr. Connolly from surgery who will evaluate the patient.              Attending Attestation:         Attending Critical Care:   Critical Care Times:   Direct Patient Care (initial evaluation, reassessments, and time considering the case)................................................................30 minutes.   Additional History from reviewing old medical records or taking additional history from the family, EMS, PCP, etc.......................10 minutes.   Ordering, Reviewing, and Interpreting Diagnostic Studies...............................................................................................................10 minutes.   Documentation..................................................................................................................................................................................10 minutes.   Consultation with other Physicians.  .................................................................................................................................................10 minutes.   ==============================================================  · Total Critical Care Time - exclusive of procedural time: 70 minutes.  ==============================================================               Labs Reviewed   COMPREHENSIVE METABOLIC PANEL - Abnormal; Notable for the following components:       Result Value    Glucose 150 (*)     All other components within normal limits   URINALYSIS, REFLEX TO URINE CULTURE - Abnormal; Notable for the following components:    Appearance, UA Hazy (*)     Leukocytes, UA 2+ (*)     All other components within normal limits    Narrative:     Preferred Collection Type->Urine, Clean Catch   URINALYSIS MICROSCOPIC - Abnormal; Notable for the following components:    WBC, UA 13 (*)     All other components within normal limits    Narrative:     Preferred Collection Type->Urine, Clean Catch   CULTURE, URINE   CULTURE, BLOOD   CULTURE, BLOOD   CBC W/ AUTO DIFFERENTIAL   LIPASE   PROTIME-INR   APTT   LACTIC ACID, PLASMA   TYPE & SCREEN       X-Ray Chest AP Portable   Final Result      No radiographic evidence of acute intrathoracic process.         Electronically signed by: Johnny Perez MD   Date:    05/20/2019   Time:    07:00      CT Abdomen Pelvis  Without Contrast   Final Result   Abnormal      1.  Thickened blind-ending tubular structure which appears to correspond to the appendix with mild adjacent fat stranding concerning for a dilated appendix.  This most likely relates to acute appendicitis.  A superimposed neoplastic process involving the appendix or cecal base is not excluded given soft tissue attenuation thickening at the base and mid portions of the appendix.  Surgical consultation and close attention is advised.      2.  Mild wall thickening involving the distal terminal ileum, likely relating to  reactive change secondary to adjacent inflammation.      3.  Hepatomegaly with hepatic steatosis.      4.  Hiatal hernia.      This report was flagged in Epic as abnormal.         Electronically signed by: Johnny Perez MD   Date:    05/20/2019   Time:    06:46          I have cultured pt, started abx, will consult surgery         Clinical Impression:       ICD-10-CM ICD-9-CM   1. Appendicitis, unspecified appendicitis type K37 541   2. Preop cardiovascular exam Z01.810 V72.81   3. Fever, unspecified fever cause R50.9 780.60                                Geoff Weston MD  05/20/19 08

## 2019-05-20 NOTE — ED TRIAGE NOTES
Pt reports to ED via personal transportation with  with c/o difuse lower abdominal pain & nausea starting around 3am this morning; pt reports LBM was this morning but denies any vomiting, diarrhea, or constipation; pt reports taking Tums with no relief; pt reports the pain is constant cramping with intermittent sharp pains; pt denies any medical hx and denies taking any daily/prescribed medications; pt AAOx4

## 2019-05-20 NOTE — ANESTHESIA PREPROCEDURE EVALUATION
05/20/2019  Ny Romeo is a 64 y.o., female.    Anesthesia Evaluation     I have reviewed the Nursing Notes.      Review of Systems  Anesthesia Hx:  No problems with previous Anesthesia   Social:  Former Smoker    Cardiovascular:  Cardiovascular Normal     Pulmonary:  Pulmonary Normal    Renal/:  Renal/ Normal     Hepatic/GI:   No Bowel Prep. Denies PUD. Denies Liver Disease. Denies Hepatitis. Acute appy   Neurological:  Neurology Normal    Endocrine:  Endocrine Normal        Physical Exam  General:  Obesity    Airway/Jaw/Neck:   m4  Small mouth opening  Denies loose dentition  Somewhat reduced neck ROM  Moderate prognatism     Chest/Lungs:  Chest/Lungs Clear    Heart/Vascular:  Heart Findings: Normal       Mental Status:  Mental Status Findings: Normal        Anesthesia Plan  Type of Anesthesia, risks & benefits discussed:  Anesthesia Type:  general  Patient's Preference:   Intra-op Monitoring Plan: standard ASA monitors  Intra-op Monitoring Plan Comments:   Post Op Pain Control Plan:   Post Op Pain Control Plan Comments:   Induction:   IV  Beta Blocker:  Patient is not currently on a Beta-Blocker (No further documentation required).       Informed Consent: Patient understands risks and agrees with Anesthesia plan.  Questions answered. Anesthesia consent signed with patient.  ASA Score: 2  emergent   Day of Surgery Review of History & Physical:  There are no significant changes.  H&P update referred to the provider.     Anesthesia Plan Notes: Will have glidescope in the room given difficult airway        Ready For Surgery From Anesthesia Perspective.

## 2019-05-20 NOTE — OP NOTE
DATE OF PROCEDURE:  05/20/2019.    PREOPERATIVE DIAGNOSIS:  Acute appendicitis.    POSTOPERATIVE DIAGNOSIS:  Perforated appendicitis.    PROCEDURE:  Laparoscopic appendectomy/abdominal washout.    SURGEON:  Pio Rose III, M.D.    ANESTHESIA:  General.    BLOOD LOSS:  Minimal.    CLINICAL HISTORY:  A 64-year-old female, seen in the emergency room with new   onset of right lower abdominal pain and a CT scan consistent with simple   appendicitis.  She consented for appendectomy.    PROCEDURE IN DETAIL:  The patient was brought into the operating room and placed   on the operating table in supine position.  The abdomen was prepped and draped   in sterile fashion.  A 2 cm incision was made above the umbilicus in the   midline.  Electrocautery was used to dissect through the subcutaneous tissue   down to the fascia, which was grasped with Kocher clamps.  It was incised for   about 1.5 cm.  The peritoneal cavity was entered.  There were no surrounding   adhesions.  A 2-0 Vicryl stay sutures were placed.  The Bryant trocar was   introduced.  Pneumoperitoneum was instituted.  Laparoscope was inserted.  There   was no damage to the underlying bowel.  There was an inflammatory process   throughout all 4 quadrants of the abdomen and purulence throughout all 4   quadrants of the abdomen.  A 5-mm trocar was placed suprapubically and in the   patient's right lower quadrant.  Under direct visualization, the appendix was   identified.  It was acutely inflamed.  It was adherent to adhesions to the   terminal ileum.  The appendix was grasped.  These adhesions were taken down   easily or bluntly with no damage to the ileum.  The LigaSure was used to take   down the mesoappendix down to the base of the appendix.  Next, an Endo-LUKE   stapling device was used to transect the appendix at its base.  It was held for   1 minute, fired and withdrawn.  The staple line was inspected, it was intact.    There was no bleeding or leakage of  enteric contents.  The appendix was placed   within the Endobag and eventually removed from the supraumbilical port.  The   purulence lateral to the liver was aspirated and the purulence in the pelvis and   the dependent portion of the pelvis was aspirated.  Then, the entire abdomen in   all 4 quadrants and in between all bowel loops was irrigated with 3 liters of   saline.  All of this fluid was aspirated making sure to irrigate in between all   of the bowel loops as well.  Eventually, the returned fluid was clear.    Approximately 15 minutes were spent attempting to aspirate all of this fluid   from the abdomen.  The right lower quadrant was inspected.  It was found to be   hemostatic.  The stump was intact.  The trocars were removed.  Pneumoperitoneum   was released.  The periumbilical fascial defect was closed with the previously   placed Vicryl sutures.  The wounds were irrigated with normal saline.  The skin   was closed with staples.  A 10 mL of 0.25% Marcaine with epinephrine was   injected for local anesthesia.  Sterile dressings were applied.  The patient   tolerated the procedure well.      LISA/MESERET  dd: 05/20/2019 10:54:21 (CDT)  td: 05/20/2019 13:36:34 (CDT)  Doc ID   #[id number]  Job ID #[id number]    CC:

## 2019-05-21 LAB
ANION GAP SERPL CALC-SCNC: 5 MMOL/L (ref 8–16)
BASOPHILS # BLD AUTO: 0.01 K/UL (ref 0–0.2)
BASOPHILS NFR BLD: 0.1 % (ref 0–1.9)
BUN SERPL-MCNC: 13 MG/DL (ref 8–23)
CALCIUM SERPL-MCNC: 8.7 MG/DL (ref 8.7–10.5)
CHLORIDE SERPL-SCNC: 107 MMOL/L (ref 95–110)
CO2 SERPL-SCNC: 25 MMOL/L (ref 23–29)
CREAT SERPL-MCNC: 0.8 MG/DL (ref 0.5–1.4)
DIFFERENTIAL METHOD: ABNORMAL
EOSINOPHIL # BLD AUTO: 0 K/UL (ref 0–0.5)
EOSINOPHIL NFR BLD: 0.3 % (ref 0–8)
ERYTHROCYTE [DISTWIDTH] IN BLOOD BY AUTOMATED COUNT: 14 % (ref 11.5–14.5)
EST. GFR  (AFRICAN AMERICAN): >60 ML/MIN/1.73 M^2
EST. GFR  (NON AFRICAN AMERICAN): >60 ML/MIN/1.73 M^2
GLUCOSE SERPL-MCNC: 107 MG/DL (ref 70–110)
HCT VFR BLD AUTO: 36.5 % (ref 37–48.5)
HGB BLD-MCNC: 11.8 G/DL (ref 12–16)
LYMPHOCYTES # BLD AUTO: 2.1 K/UL (ref 1–4.8)
LYMPHOCYTES NFR BLD: 20.7 % (ref 18–48)
MCH RBC QN AUTO: 29.6 PG (ref 27–31)
MCHC RBC AUTO-ENTMCNC: 32.3 G/DL (ref 32–36)
MCV RBC AUTO: 92 FL (ref 82–98)
MONOCYTES # BLD AUTO: 0.7 K/UL (ref 0.3–1)
MONOCYTES NFR BLD: 7.2 % (ref 4–15)
NEUTROPHILS # BLD AUTO: 7.1 K/UL (ref 1.8–7.7)
NEUTROPHILS NFR BLD: 71.7 % (ref 38–73)
PLATELET # BLD AUTO: 161 K/UL (ref 150–350)
PMV BLD AUTO: 10.8 FL (ref 9.2–12.9)
POTASSIUM SERPL-SCNC: 4 MMOL/L (ref 3.5–5.1)
RBC # BLD AUTO: 3.99 M/UL (ref 4–5.4)
SODIUM SERPL-SCNC: 137 MMOL/L (ref 136–145)
WBC # BLD AUTO: 9.89 K/UL (ref 3.9–12.7)

## 2019-05-21 PROCEDURE — 97530 THERAPEUTIC ACTIVITIES: CPT

## 2019-05-21 PROCEDURE — 94799 UNLISTED PULMONARY SVC/PX: CPT

## 2019-05-21 PROCEDURE — 80048 BASIC METABOLIC PNL TOTAL CA: CPT

## 2019-05-21 PROCEDURE — 97161 PT EVAL LOW COMPLEX 20 MIN: CPT

## 2019-05-21 PROCEDURE — 99900035 HC TECH TIME PER 15 MIN (STAT)

## 2019-05-21 PROCEDURE — 63600175 PHARM REV CODE 636 W HCPCS: Performed by: SURGERY

## 2019-05-21 PROCEDURE — 85025 COMPLETE CBC W/AUTO DIFF WBC: CPT

## 2019-05-21 PROCEDURE — S0030 INJECTION, METRONIDAZOLE: HCPCS | Performed by: SURGERY

## 2019-05-21 PROCEDURE — 11000001 HC ACUTE MED/SURG PRIVATE ROOM

## 2019-05-21 PROCEDURE — 36415 COLL VENOUS BLD VENIPUNCTURE: CPT

## 2019-05-21 PROCEDURE — 25000003 PHARM REV CODE 250: Performed by: SURGERY

## 2019-05-21 RX ADMIN — HYDROCODONE BITARTRATE AND ACETAMINOPHEN 1 TABLET: 10; 325 TABLET ORAL at 12:05

## 2019-05-21 RX ADMIN — METRONIDAZOLE 500 MG: 500 INJECTION, SOLUTION INTRAVENOUS at 05:05

## 2019-05-21 RX ADMIN — CIPROFLOXACIN 400 MG: 2 INJECTION, SOLUTION INTRAVENOUS at 07:05

## 2019-05-21 RX ADMIN — IBUPROFEN 400 MG: 400 TABLET, FILM COATED ORAL at 05:05

## 2019-05-21 RX ADMIN — FAMOTIDINE 20 MG: 20 TABLET ORAL at 08:05

## 2019-05-21 RX ADMIN — METRONIDAZOLE 500 MG: 500 INJECTION, SOLUTION INTRAVENOUS at 01:05

## 2019-05-21 RX ADMIN — CIPROFLOXACIN 400 MG: 2 INJECTION, SOLUTION INTRAVENOUS at 08:05

## 2019-05-21 RX ADMIN — METRONIDAZOLE 500 MG: 500 INJECTION, SOLUTION INTRAVENOUS at 08:05

## 2019-05-21 RX ADMIN — HYDROCODONE BITARTRATE AND ACETAMINOPHEN 1 TABLET: 10; 325 TABLET ORAL at 07:05

## 2019-05-21 RX ADMIN — HYDROCODONE BITARTRATE AND ACETAMINOPHEN 1 TABLET: 10; 325 TABLET ORAL at 08:05

## 2019-05-21 RX ADMIN — HYDROCODONE BITARTRATE AND ACETAMINOPHEN 1 TABLET: 10; 325 TABLET ORAL at 03:05

## 2019-05-21 RX ADMIN — SODIUM CHLORIDE, SODIUM LACTATE, POTASSIUM CHLORIDE, AND CALCIUM CHLORIDE: .6; .31; .03; .02 INJECTION, SOLUTION INTRAVENOUS at 05:05

## 2019-05-21 RX ADMIN — ENOXAPARIN SODIUM 40 MG: 100 INJECTION SUBCUTANEOUS at 05:05

## 2019-05-21 NOTE — PT/OT/SLP EVAL
Physical Therapy Evaluation and Discharge Note    Patient Name:  Ny Romeo   MRN:  [id number]    Recommendations:     Discharge Recommendations:  home   Discharge Equipment Recommendations: none   Barriers to discharge: None    Assessment:     Ny Romeo is a 64 y.o. female admitted with a medical diagnosis of Acute perforated appendicitis. .  At this time, patient is functioning at their prior level of function and does not require further acute PT services.     Recent Surgery: Procedure(s) (LRB):  APPENDECTOMY, LAPAROSCOPIC (N/A) 1 Day Post-Op    Plan:     During this hospitalization, patient does not require further acute PT services.  Please re-consult if situation changes.      Subjective     Chief Complaint: minimal pain  Patient/Family Comments/goals: PLOF, to go home  Pain/Comfort:  Pain Rating 1: 2/10  Location 1: abdomen    Patients cultural, spiritual, Episcopal conflicts given the current situation: no    Living Environment:  Pt lives with spouse in a townhouse with B HR's on stairs to 2nd level   Prior to admission, patients level of function was Indepenent.  Equipment used at home: none.  DME owned (not currently used): none.  Upon discharge, patient will have assistance from spouse.    Objective:     Communicated with nsg prior to session.  Patient found HOB elevated with peripheral IV upon PT entry to room.    General Precautions: Standard, fall   Orthopedic Precautions:N/A   Braces: N/A     Exams:  · Cognitive Exam:  Patient is oriented to Person, Place, Time and Situation  · Gross Motor Coordination:  WFL  · Postural Exam:  Patient presented with the following abnormalities:    · -       Rounded shoulders  · -       Forward head  · Sensation:    · -       Intact  light/touch B LE's  · Skin Integrity/Edema:      · -       Skin integrity: Visible skin intact  · RLE ROM: WFL  · RLE Strength: WFL  · LLE ROM: WFL  · LLE Strength: WFL    Functional Mobility:  · Bed Mobility:      · Scooting: modified independence  · Supine to Sit: modified independence  · Transfers:     · Sit to Stand:  modified independence with no AD  · Gait: 250' Mod I, occasionally using HR  · Balance: Good-    AM-PAC 6 CLICK MOBILITY  Total Score:23       Therapeutic Activities and Exercises:   eval only    AM-PAC 6 CLICK MOBILITY  Total Score:23     Patient left up in chair with all lines intact, call button in reach, nsg notified and spouse present.    GOALS:   Multidisciplinary Problems     Physical Therapy Goals     Not on file          Multidisciplinary Problems (Resolved)        Problem: Physical Therapy Goal    Goal Priority Disciplines Outcome Goal Variances Interventions   Physical Therapy Goal   (Resolved)     PT, PT/OT Outcome(s) achieved                     History:     History reviewed. No pertinent past medical history.    Past Surgical History:   Procedure Laterality Date    APPENDECTOMY, LAPAROSCOPIC N/A 5/20/2019    Performed by Pio Castillo III, MD at [facility] OR       Time Tracking:     PT Received On: 05/21/19  PT Start Time: 1115     PT Stop Time: 1138  PT Total Time (min): 23 min     Billable Minutes: Evaluation 15 and Therapeutic Activity 8      Larissa Bishop, PT  05/21/2019

## 2019-05-21 NOTE — PLAN OF CARE
"TN reviewed follow up appointment information as well as  "Post op discharge instructions" handout with patient using teach back while informing patient to concentrate on signs and symptoms to look for after discharge that would flag her that she needs to contact the doctor. Patient is in agreement and verbalized an understanding. Placed discharge information in blue discharge folder.  TN also reviewed patient responsibility checklist with her using teach back. Patient was able to verbalize her responsibilities after discharge to manager her care at home being   1. Going to follow up appointments   2.  rx from the pharmacy when discharged  3. Taking her medication as prescribed     Patient's nurse, Bernadine, informed that patient can discharge from  standpoint when applicable Nurse can now complete discharge and review signs and symptoms teaching.        05/21/19 7987   Final Note   Assessment Type Final Discharge Note   Anticipated Discharge Disposition Home   What phone number can be called within the next 1-3 days to see how you are doing after discharge?   (Listed in chart)   Hospital Follow Up  Appt(s) scheduled? Yes   Discharge plans and expectations educations in teach back method with documentation complete? Yes   Right Care Referral Info   Post Acute Recommendation No Care     "

## 2019-05-21 NOTE — PROGRESS NOTES
5438 TN contacted Dr. Castillo's office at [phone]; spoke with Sagrario; scheduled for post-up f/u on 06/04/19 at 2:00 PM.

## 2019-05-21 NOTE — PROGRESS NOTES
Follow-up Information     Pio Castillo III, MD On 6/4/2019.    Specialty:  Surgery  Why:  Outpatient Services General Surgery Tuesday at 2:00 PM  Contact information:  [place] [address] [phone]               PLEASE BRING TO ALL FOLLOW UP APPOINTMENTS:   1) A COPY YOUR DISCHARGE INSTRUCTIONS   2) ALL MEDICINES YOU ARE CURRENTLY TAKING IN THEIR ORIGINAL BOTTLES   3) IDENTIFICATION CARD   4) INSURANCE CARD    **PLEASE ARRIVE 15 MINUTES AHEAD OF SCHEDULED APPOINTMENT TIME   ++PLEASE CALL 24 HOURS IN ADVANCE IF YOU MUST RESCHEDULE YOUR APPOINTMENT DAY AND/OR TIME     [facility] CARE MANAGEMENT WRITTEN DISCHARGE INFORMATION    APPOINTMENTS AND RESOURCES TO HELP YOU MANAGE YOUR CARE AT HOME BASED ON YOUR PREFERENCES:  (If an appointment is not scheduled for you when you leave the hospital, call your doctor to schedule a follow up visit within a week)        Healthy Living Instructions to HELP MANAGE YOUR CARE AT HOME:  Things You are responsible for:  1.    Getting your prescriptions filled   2.    Taking your medications as directed, DO NOT MISS ANY DOSES!  3.    Following the diet and exercise recommended by your doctor  4.    Going to your follow-up doctor appointment. This is important because it allows the doctor to monitor your progress and determine if any changes need to made to your treatment plan.  5. If you have any questions about MANAGING YOUR CARE AT HOME Call the Nurse Care Line for 24/7 Assistance [phone]       Please answer any calls you may receive from [facility]. We want to continue to support you as you manage your healthcare needs. [facility] is happy to have the opportunity to serve you.      Thank you for choosing [facility] for your healthcare needs!  Your [facility] Case Management Team,    Carin Suh RN [place]  Registered Nurse Transition Navigator  [phone]

## 2019-05-21 NOTE — PLAN OF CARE
Problem: Physical Therapy Goal  Goal: Physical Therapy Goal  Outcome: Outcome(s) achieved Date Met: 05/21/19  Initial PT evaluation performed.  Pt with no skilled pT or DME needs.  OK to ambulate with nursing staff 2x/day per Dr's orders.  PT to sign off.

## 2019-05-21 NOTE — PROGRESS NOTES
"gen surg pod 1  Op findings d/w pt; much less abd pain since yest, appetite returning, passing flatus  Blood pressure (!) 119/59, pulse 108, temperature 98.9 °F (37.2 °C), temperature source Oral, resp. rate 18, height 5' 7" (1.702 m), weight 83.9 kg (185 lb), SpO2 (!) 93 %, not currently breastfeeding.  voided twice last night  Wbc 9  hct 36  Lungs ctab  cv rrr  abd soft, bs present, nd,appropriate inc tenderness, dressings w min dry ss dc  A/p s/p lap appy for perf appendicitis- oob, IS, clears po cautiously, cont abx  "

## 2019-05-21 NOTE — PLAN OF CARE
"TN met with pt and pt's family at bedside. TN explained her role in Care Management. Pt voiced understanding. TN inquired about HELP AT HOME. Pt stated that she will have spouse at home to help for support. TN voiced understanding. TN inquired about responsibilities when it comes to  MANAGING HER HEALTH at home and what it entails. Pt inquired about details. TN informed pt of RESPONSIBILITIES of:    1. Follow up appointments  2. Getting Prescriptions filled  3. Taking medications as prescribed.     Pt voiced understanding.  TN explained "My Health Packet" blue folder and the pink and green tabs that are on the folder as well.  Pt voiced understanding.     Pt's pharmacy: [facility]    Pt's preference for appointments: No preference       05/21/19 7154   Discharge Assessment   Assessment Type Discharge Planning Assessment   Confirmed/corrected address and phone number on facesheet? Yes   Assessment information obtained from? Patient   Expected Length of Stay (days) 3   Communicated expected length of stay with patient/caregiver yes  (Per surgeon)   Prior to hospitilization cognitive status: Alert/Oriented   Prior to hospitalization functional status: Independent   Current cognitive status: Alert/Oriented   Current Functional Status: Needs Assistance   Lives With spouse   Able to Return to Prior Arrangements yes   Is patient able to care for self after discharge? Yes   Who are your caregiver(s) and their phone number(s)?   (Pt's spouse, Houston Romeo)   Patient's perception of discharge disposition home or selfcare   Readmission Within the Last 30 Days no previous admission in last 30 days   Patient currently being followed by outpatient case management? No   Patient currently receives any other outside agency services? No   Equipment Currently Used at Home none   Do you have any problems affording any of your prescribed medications? No   Is the patient taking medications as prescribed? yes "   Does the patient have transportation home? Yes   Transportation Anticipated family or friend will provide   Does the patient receive services at the [facility]? No   DME Needed Upon Discharge  none   Patient/Family in Agreement with Plan yes

## 2019-05-22 LAB — BACTERIA UR CULT: NORMAL

## 2019-05-22 PROCEDURE — S0030 INJECTION, METRONIDAZOLE: HCPCS | Performed by: SURGERY

## 2019-05-22 PROCEDURE — 94799 UNLISTED PULMONARY SVC/PX: CPT

## 2019-05-22 PROCEDURE — 63600175 PHARM REV CODE 636 W HCPCS: Performed by: SURGERY

## 2019-05-22 PROCEDURE — 11000001 HC ACUTE MED/SURG PRIVATE ROOM

## 2019-05-22 PROCEDURE — 25000003 PHARM REV CODE 250: Performed by: SURGERY

## 2019-05-22 RX ORDER — CLONIDINE HYDROCHLORIDE 0.1 MG/1
0.1 TABLET ORAL EVERY 8 HOURS PRN
Status: DISCONTINUED | OUTPATIENT
Start: 2019-05-22 | End: 2019-05-29 | Stop reason: HOSPADM

## 2019-05-22 RX ADMIN — CIPROFLOXACIN 400 MG: 2 INJECTION, SOLUTION INTRAVENOUS at 08:05

## 2019-05-22 RX ADMIN — METRONIDAZOLE 500 MG: 500 INJECTION, SOLUTION INTRAVENOUS at 05:05

## 2019-05-22 RX ADMIN — METRONIDAZOLE 500 MG: 500 INJECTION, SOLUTION INTRAVENOUS at 01:05

## 2019-05-22 RX ADMIN — FAMOTIDINE 20 MG: 20 TABLET ORAL at 08:05

## 2019-05-22 RX ADMIN — HYDROCODONE BITARTRATE AND ACETAMINOPHEN 1 TABLET: 10; 325 TABLET ORAL at 07:05

## 2019-05-22 RX ADMIN — CLONIDINE HYDROCHLORIDE 0.1 MG: 0.1 TABLET ORAL at 05:05

## 2019-05-22 RX ADMIN — METRONIDAZOLE 500 MG: 500 INJECTION, SOLUTION INTRAVENOUS at 10:05

## 2019-05-22 RX ADMIN — ENOXAPARIN SODIUM 40 MG: 100 INJECTION SUBCUTANEOUS at 05:05

## 2019-05-22 RX ADMIN — IBUPROFEN 400 MG: 400 TABLET, FILM COATED ORAL at 05:05

## 2019-05-22 NOTE — PLAN OF CARE
Problem: Adult Inpatient Plan of Care  Goal: Plan of Care Review  Outcome: Ongoing (interventions implemented as appropriate)     05/21/19 1800   Plan of Care Review   Plan of Care Reviewed With patient   This nurse encouraged patient to walk and complete incentive spirometry.  Spiked a temp at 4 pm. Temp 101.6 oral, on iv flagyl and cipro Encouraged IS, she states she completed IS 3 times this am shift. Walked with PT without difficulty. Pain is controlled with by mouth hydrocodone.

## 2019-05-22 NOTE — PLAN OF CARE
Problem: Adult Inpatient Plan of Care  Goal: Patient-Specific Goal (Individualization)  Outcome: Ongoing (interventions implemented as appropriate)  Continue to encourage incentive spirometer

## 2019-05-22 NOTE — PROGRESS NOTES
"gen surg pod 2  jayesh liquids, feeling better, passing flatus  Blood pressure 135/68, pulse 92, temperature 98.5 °F (36.9 °C), temperature source Oral, resp. rate 18, height 5' 7" (1.702 m), weight 83.9 kg (185 lb), SpO2 95 %, not currently breastfeeding.  abd soft, nl bs, nd, incs healing well, appropriate inc tenderness  A/p s/p lap appy for perf appendicitis- adv diet, oob, IS, cont iv abx  "

## 2019-05-22 NOTE — NURSING
Notified Dr Castillo that patient's SBP has been 150's-160's , now 173/86 and also a temp of 100.8F. Patient has not been tolerating IS well only getting to 500ml observed twice and encouraged to use [id number]. Verbalized understanding, received new order for clonidine 0.1mg Q8prn for SBP greater than 160.

## 2019-05-22 NOTE — PLAN OF CARE
Problem: Adult Inpatient Plan of Care  Goal: Plan of Care Review  Outcome: Ongoing (interventions implemented as appropriate)     05/22/19 1800   Plan of Care Review   Plan of Care Reviewed With patient   Patient is awake alert and oriented. Denies incisional abd pain. Staples well approximated. Patient has 3 red dime size moist red rash like sites under abdominal folds not on staples. Patient instructed to show MD upon examination in am. Verbalized understanding. Tolerating po intake, denies N/V. IV antibiotics as ordered. Sat in chair most of day. Instructed to Continue to use IS. Medicated once for temp with ibuprofen as ordered. Hourly rounds, call light in reach, free of falls. Instructed to call for assistance if needed.  Continue with plan of care as ordered.

## 2019-05-22 NOTE — PLAN OF CARE
Problem: Adult Inpatient Plan of Care  Goal: Plan of Care Review  Outcome: Ongoing (interventions implemented as appropriate)     05/20/19 1800   Plan of Care Review   Plan of Care Reviewed With patient;spouse   Patient came to the floor post surgery, this nurse encouraged ambulation and IS, tolerated ice chips at this time, SCDs in place

## 2019-05-23 LAB
BASOPHILS # BLD AUTO: 0.01 K/UL (ref 0–0.2)
BASOPHILS NFR BLD: 0.1 % (ref 0–1.9)
DIFFERENTIAL METHOD: ABNORMAL
EOSINOPHIL # BLD AUTO: 0.2 K/UL (ref 0–0.5)
EOSINOPHIL NFR BLD: 1.9 % (ref 0–8)
ERYTHROCYTE [DISTWIDTH] IN BLOOD BY AUTOMATED COUNT: 13.7 % (ref 11.5–14.5)
HCT VFR BLD AUTO: 34.1 % (ref 37–48.5)
HGB BLD-MCNC: 11 G/DL (ref 12–16)
LYMPHOCYTES # BLD AUTO: 1.1 K/UL (ref 1–4.8)
LYMPHOCYTES NFR BLD: 14.6 % (ref 18–48)
MCH RBC QN AUTO: 29.3 PG (ref 27–31)
MCHC RBC AUTO-ENTMCNC: 32.3 G/DL (ref 32–36)
MCV RBC AUTO: 91 FL (ref 82–98)
MONOCYTES # BLD AUTO: 0.8 K/UL (ref 0.3–1)
MONOCYTES NFR BLD: 9.9 % (ref 4–15)
NEUTROPHILS # BLD AUTO: 5.7 K/UL (ref 1.8–7.7)
NEUTROPHILS NFR BLD: 73.5 % (ref 38–73)
PLATELET # BLD AUTO: 168 K/UL (ref 150–350)
PMV BLD AUTO: 10.5 FL (ref 9.2–12.9)
RBC # BLD AUTO: 3.75 M/UL (ref 4–5.4)
WBC # BLD AUTO: 7.8 K/UL (ref 3.9–12.7)

## 2019-05-23 PROCEDURE — 36415 COLL VENOUS BLD VENIPUNCTURE: CPT

## 2019-05-23 PROCEDURE — 11000001 HC ACUTE MED/SURG PRIVATE ROOM

## 2019-05-23 PROCEDURE — 85025 COMPLETE CBC W/AUTO DIFF WBC: CPT

## 2019-05-23 PROCEDURE — S0030 INJECTION, METRONIDAZOLE: HCPCS | Performed by: SURGERY

## 2019-05-23 PROCEDURE — 63600175 PHARM REV CODE 636 W HCPCS: Performed by: SURGERY

## 2019-05-23 PROCEDURE — 25000003 PHARM REV CODE 250: Performed by: SURGERY

## 2019-05-23 RX ORDER — SODIUM CHLORIDE, SODIUM LACTATE, POTASSIUM CHLORIDE, CALCIUM CHLORIDE 600; 310; 30; 20 MG/100ML; MG/100ML; MG/100ML; MG/100ML
INJECTION, SOLUTION INTRAVENOUS CONTINUOUS
Status: DISCONTINUED | OUTPATIENT
Start: 2019-05-23 | End: 2019-05-24

## 2019-05-23 RX ADMIN — METRONIDAZOLE 500 MG: 500 INJECTION, SOLUTION INTRAVENOUS at 01:05

## 2019-05-23 RX ADMIN — METRONIDAZOLE 500 MG: 500 INJECTION, SOLUTION INTRAVENOUS at 04:05

## 2019-05-23 RX ADMIN — FAMOTIDINE 20 MG: 20 TABLET ORAL at 08:05

## 2019-05-23 RX ADMIN — CIPROFLOXACIN 400 MG: 2 INJECTION, SOLUTION INTRAVENOUS at 11:05

## 2019-05-23 RX ADMIN — METRONIDAZOLE 500 MG: 500 INJECTION, SOLUTION INTRAVENOUS at 12:05

## 2019-05-23 RX ADMIN — ENOXAPARIN SODIUM 40 MG: 100 INJECTION SUBCUTANEOUS at 04:05

## 2019-05-23 RX ADMIN — IBUPROFEN 400 MG: 400 TABLET, FILM COATED ORAL at 04:05

## 2019-05-23 RX ADMIN — CLONIDINE HYDROCHLORIDE 0.1 MG: 0.1 TABLET ORAL at 04:05

## 2019-05-23 RX ADMIN — FAMOTIDINE 20 MG: 20 TABLET ORAL at 09:05

## 2019-05-23 RX ADMIN — SODIUM CHLORIDE, SODIUM LACTATE, POTASSIUM CHLORIDE, AND CALCIUM CHLORIDE: .6; .31; .03; .02 INJECTION, SOLUTION INTRAVENOUS at 07:05

## 2019-05-23 NOTE — PROGRESS NOTES
"gen surg pod 3   jayesh about 25% meals yest, passing flatus  Blood pressure (!) 165/72, pulse 91, temperature 99.5 °F (37.5 °C), temperature source Oral, resp. rate 18, height 5' 7" (1.702 m), weight 83.9 kg (185 lb), SpO2 (!) 93 %, not currently breastfeeding.  abd soft, nl bs, nd, incs healing well, mild irritation from adhesive ,appropriate inc tenderness  Path pending  A/p s/p lap appy/abd washout for perf appendicitis- restart ivf , oob, IS, cont abx,stressed imporatance of IS, if does not cont to make progress over next sev days will image abd to eval for IAA, d/w pt and  at [facility]  "

## 2019-05-23 NOTE — PLAN OF CARE
Problem: Adult Inpatient Plan of Care  Goal: Plan of Care Review  Outcome: Ongoing (interventions implemented as appropriate)     05/23/19 3429   Plan of Care Review   Plan of Care Reviewed With patient   No falls, trauma or injury this shift. No complaints of pain voiced. Patient ambulated through the unit gait steady. Patient using incentive spirometer per instructions. Infection managed with IV medications. Continue with plan of care and continue to monitor patient.

## 2019-05-24 PROCEDURE — 25000003 PHARM REV CODE 250: Performed by: SURGERY

## 2019-05-24 PROCEDURE — S0030 INJECTION, METRONIDAZOLE: HCPCS | Performed by: SURGERY

## 2019-05-24 PROCEDURE — 11000001 HC ACUTE MED/SURG PRIVATE ROOM

## 2019-05-24 PROCEDURE — 87086 URINE CULTURE/COLONY COUNT: CPT

## 2019-05-24 PROCEDURE — 63600175 PHARM REV CODE 636 W HCPCS: Performed by: SURGERY

## 2019-05-24 RX ADMIN — CIPROFLOXACIN 400 MG: 2 INJECTION, SOLUTION INTRAVENOUS at 11:05

## 2019-05-24 RX ADMIN — METRONIDAZOLE 500 MG: 500 INJECTION, SOLUTION INTRAVENOUS at 06:05

## 2019-05-24 RX ADMIN — METRONIDAZOLE 500 MG: 500 INJECTION, SOLUTION INTRAVENOUS at 02:05

## 2019-05-24 RX ADMIN — FAMOTIDINE 20 MG: 20 TABLET ORAL at 08:05

## 2019-05-24 RX ADMIN — METRONIDAZOLE 500 MG: 500 INJECTION, SOLUTION INTRAVENOUS at 08:05

## 2019-05-24 RX ADMIN — ENOXAPARIN SODIUM 40 MG: 100 INJECTION SUBCUTANEOUS at 06:05

## 2019-05-24 NOTE — ANESTHESIA POSTPROCEDURE EVALUATION
Anesthesia Post Evaluation    Patient: Ny Romeo    Procedure(s) Performed: Procedure(s) (LRB):  APPENDECTOMY, LAPAROSCOPIC (N/A)    Final Anesthesia Type: general  Patient location during evaluation: PACU  Patient participation: Yes- Able to Participate  Level of consciousness: awake and alert  Post-procedure vital signs: reviewed and stable  Pain management: adequate  Airway patency: patent  PONV status at discharge: No PONV  Anesthetic complications: no      Cardiovascular status: blood pressure returned to baseline and hemodynamically stable  Respiratory status: unassisted and spontaneous ventilation  Hydration status: euvolemic  Follow-up not needed.          Vitals Value Taken Time   /59 5/24/2019 12:11 AM   Temp 36.7 °C (98.1 °F) 5/24/2019 12:11 AM   Pulse 80 5/24/2019 12:11 AM   Resp 18 5/24/2019 12:11 AM   SpO2 97 % 5/24/2019 12:11 AM         Event Time     Out of Recovery 14:03:13          Pain/Porsha Score: Pain Rating Prior to Med Admin: 0 (5/23/2019  4:23 PM)

## 2019-05-24 NOTE — PROGRESS NOTES
"gen surg pod 4  Normal appetite, had bm, feels well, no dysuria or sob  Blood pressure (!) 125/59, pulse 80, temperature 98.1 °F (36.7 °C), temperature source Oral, resp. rate 18, height 5' 7" (1.702 m), weight 83.9 kg (185 lb), SpO2 97 %, not currently breastfeeding.  path pending  Lungs ctab  cv rrr  abd soft, nl bs, nd,incs healing well, no sign wound infection  A/p s/p lap appy for perf appendicitis- get urine cx/cxr for fever, wbc has been normal so far, cont oob,IS, ultimately if cont to have fever will get ct abd in next few days to eval for IAA  "

## 2019-05-24 NOTE — PLAN OF CARE
Problem: Adult Inpatient Plan of Care  Goal: Plan of Care Review  Outcome: Ongoing (interventions implemented as appropriate)  Ao x4, no falls or injury. Denies pain. Febrile this shift, received iv abx. Incision clean dry and intact. Awaiting urine cx results.

## 2019-05-24 NOTE — PLAN OF CARE
05/24/19 1442   Discharge Reassessment   Assessment Type Discharge Planning Reassessment   Provided patient/caregiver education on the expected discharge date and the discharge plan No   Do you have any problems affording any of your prescribed medications? No   Discharge Plan A Home with family   DME Needed Upon Discharge  none   Patient choice form signed by patient/caregiver No   Anticipated Discharge Disposition Home   Can the patient answer the patient profile reliably? Yes, cognitively intact   How does the patient rate their overall health at the present time? Good   Describe the patient's ability to walk at the present time. Minor restrictions or changes   How often would a person be available to care for the patient? Whenever needed

## 2019-05-24 NOTE — PLAN OF CARE
Problem: Adult Inpatient Plan of Care  Goal: Plan of Care Review  Outcome: Ongoing (interventions implemented as appropriate)     05/23/19 1948   Plan of Care Review   Plan of Care Reviewed With patient     Patient remained free from falls, trauma, and injuries throughout shift. No complaints of pain, nausea or vomiting.  IV fluids, IV antibiotics, and medications administered as prescribed. Ambulated frequently around nursing unit. Patient passing flatus, but no reports of bowel movement. Staples clean, dry, and intact. Spiked temp; controlled with prn antipyretic. No acute distress noted.

## 2019-05-24 NOTE — PLAN OF CARE
Problem: Pain (Appendectomy)  Goal: Acceptable Pain Control  Outcome: Ongoing (interventions implemented as appropriate)  Intervention: Prevent or Manage Pain     05/24/19 0404   Prevent and Minimize Fear   Diversional Activities smartphone;television   Prevent or Manage Pain   Pain Management Interventions breathing exercises;care clustered;diversional activity provided;pain management plan reviewed with patient/caregiver;pillow support provided;position adjusted;quiet environment facilitated

## 2019-05-25 LAB
BACTERIA BLD CULT: NORMAL
BACTERIA BLD CULT: NORMAL

## 2019-05-25 PROCEDURE — 25000003 PHARM REV CODE 250: Performed by: SURGERY

## 2019-05-25 PROCEDURE — 63600175 PHARM REV CODE 636 W HCPCS: Performed by: SURGERY

## 2019-05-25 PROCEDURE — S0030 INJECTION, METRONIDAZOLE: HCPCS | Performed by: SURGERY

## 2019-05-25 PROCEDURE — 11000001 HC ACUTE MED/SURG PRIVATE ROOM

## 2019-05-25 RX ADMIN — METRONIDAZOLE 500 MG: 500 INJECTION, SOLUTION INTRAVENOUS at 01:05

## 2019-05-25 RX ADMIN — METRONIDAZOLE 500 MG: 500 INJECTION, SOLUTION INTRAVENOUS at 04:05

## 2019-05-25 RX ADMIN — CIPROFLOXACIN 400 MG: 2 INJECTION, SOLUTION INTRAVENOUS at 11:05

## 2019-05-25 RX ADMIN — CLONIDINE HYDROCHLORIDE 0.1 MG: 0.1 TABLET ORAL at 09:05

## 2019-05-25 RX ADMIN — FAMOTIDINE 20 MG: 20 TABLET ORAL at 09:05

## 2019-05-25 RX ADMIN — ENOXAPARIN SODIUM 40 MG: 100 INJECTION SUBCUTANEOUS at 04:05

## 2019-05-25 RX ADMIN — METRONIDAZOLE 500 MG: 500 INJECTION, SOLUTION INTRAVENOUS at 08:05

## 2019-05-25 RX ADMIN — FAMOTIDINE 20 MG: 20 TABLET ORAL at 08:05

## 2019-05-25 NOTE — PROGRESS NOTES
Surgery    Patient doing well no complaints    Patient reports every day between 11 and 4 she has a temperature greater than 100.0    Tolerating a diet    All cultures negative    Vital signs stable afebrile cannot document recorded temperature    Abdomen soft nontender nondistended ; incisions clean dry and intact    Assessment status post lap appy with subjective fevers    Plan-will watch the patient very closely if continues with the subjective fevers well imaged with CT scan.  All cultures negative so far all discussed with the patient and her  in the room

## 2019-05-25 NOTE — NURSING
Received report from DINORA Peralta. Alert and oriented. No pain. No sign of distress. No cardiac monitor. IV line over Rt hand intact - infusing with MAYNOR. Staple noted over abdominal incision. Call bell given on her reach, instructed to call for assistance all the time. Patient independent with own needs, not keen having bed alarm. Bed on lowest position. Safety maintained.

## 2019-05-25 NOTE — NURSING
Pt aaox4 ambulates independently in [place]. Pt denies pain, n/v. Incisions to abdomen c/d/i. Encouraged pt to use IS 4hrs, pt can reach up to 1500ml. No fever during am shift. Call light w/i reach, bed in lowest position.

## 2019-05-25 NOTE — PLAN OF CARE
Problem: Fall Injury Risk  Goal: Absence of Fall and Fall-Related Injury  Outcome: Ongoing (interventions implemented as appropriate)  Intervention: Identify and Manage Contributors to Fall Injury Risk     05/25/19 0418   Manage Acute Allergic Reaction   Medication Review/Management medications reviewed   Identify and Manage Contributors to Fall Injury Risk   Self-Care Promotion independence encouraged;BADL personal objects within reach     Intervention: Promote Injury-Free Environment     05/25/19 0418   Optimize [place] and Functional Mobility   Environmental Safety Modification assistive device/personal items within reach;clutter free environment maintained;lighting adjusted;mobility aid in reach;mattress on floor;room near unit station;room organization consistent   Optimize Balance and Safe Activity   Safety Promotion/Fall Prevention assistive device/personal item within reach;bed alarm set;upper side rails raised x 2, lower siderails raised x 1 (Peds only);instructed to call staff for mobility;nonskid shoes/socks when out of bed

## 2019-05-26 LAB — BACTERIA UR CULT: NO GROWTH

## 2019-05-26 PROCEDURE — 25500020 PHARM REV CODE 255: Performed by: SURGERY

## 2019-05-26 PROCEDURE — 25000003 PHARM REV CODE 250: Performed by: SURGERY

## 2019-05-26 PROCEDURE — 63600175 PHARM REV CODE 636 W HCPCS: Performed by: SURGERY

## 2019-05-26 PROCEDURE — S0030 INJECTION, METRONIDAZOLE: HCPCS | Performed by: SURGERY

## 2019-05-26 PROCEDURE — 11000001 HC ACUTE MED/SURG PRIVATE ROOM

## 2019-05-26 RX ORDER — METRONIDAZOLE 500 MG/1
500 TABLET ORAL EVERY 8 HOURS
Status: DISCONTINUED | OUTPATIENT
Start: 2019-05-26 | End: 2019-05-29 | Stop reason: HOSPADM

## 2019-05-26 RX ORDER — CIPROFLOXACIN 500 MG/1
500 TABLET ORAL EVERY 12 HOURS
Status: DISCONTINUED | OUTPATIENT
Start: 2019-05-26 | End: 2019-05-27

## 2019-05-26 RX ADMIN — FAMOTIDINE 20 MG: 20 TABLET ORAL at 08:05

## 2019-05-26 RX ADMIN — METRONIDAZOLE 500 MG: 500 TABLET ORAL at 09:05

## 2019-05-26 RX ADMIN — CIPROFLOXACIN HYDROCHLORIDE 500 MG: 500 TABLET, FILM COATED ORAL at 09:05

## 2019-05-26 RX ADMIN — FAMOTIDINE 20 MG: 20 TABLET ORAL at 09:05

## 2019-05-26 RX ADMIN — IOHEXOL 15 ML: 300 INJECTION, SOLUTION INTRAVENOUS at 01:05

## 2019-05-26 RX ADMIN — ENOXAPARIN SODIUM 40 MG: 100 INJECTION SUBCUTANEOUS at 05:05

## 2019-05-26 RX ADMIN — METRONIDAZOLE 500 MG: 500 TABLET ORAL at 03:05

## 2019-05-26 RX ADMIN — IOHEXOL 75 ML: 350 INJECTION, SOLUTION INTRAVENOUS at 01:05

## 2019-05-26 RX ADMIN — CLONIDINE HYDROCHLORIDE 0.1 MG: 0.1 TABLET ORAL at 02:05

## 2019-05-26 RX ADMIN — METRONIDAZOLE 500 MG: 500 INJECTION, SOLUTION INTRAVENOUS at 12:05

## 2019-05-26 NOTE — PLAN OF CARE
Problem: Adult Inpatient Plan of Care  Goal: Plan of Care Review  Outcome: Ongoing (interventions implemented as appropriate)     05/26/19 3563   Plan of Care Review   Plan of Care Reviewed With patient   Patient AAOX4. No c/o pain. Remains on IV antibiotics. Patient ambulating to restroom independently. Incisions to abdomen remain c/d/i and open to air. Safety maintained. Bed in low locked position, with call light in reach. Will continue to monitor.

## 2019-05-26 NOTE — NURSING
Pt aaox4 ambulates w/o difficulties in [place]. Denies pain, n/v. Incisions to abdomen c/d/i. Iv abxs changed to PO.  at bedside, call light w/i reach, bed in lowest position.

## 2019-05-26 NOTE — PROGRESS NOTES
Surgery    Patient reports that she feels better port IV access loss.    No nausea no vomiting patient reports between 11 3 every day in the morning she has fever greater than 100.0 all this is not recorded in the medical record    No nausea no vomiting    Vital signs stable afebrile    Abdomen soft nontender nondistended    Assessment status post laparoscopic appy for perforated appendicitis with subjective fevers    Plan was to oral antibiotics and imaged today to evaluate area.  All discussed at length with patient highly doubt patient has pelvic abscess but with subjective fevers I think the next thing to do is scan her

## 2019-05-27 LAB
APPEARANCE FLD: NORMAL
BODY FLD TYPE: NORMAL
COLOR FLD: NORMAL
LYMPHOCYTES NFR FLD MANUAL: 4 %
MONOS+MACROS NFR FLD MANUAL: 3 %
NEUTROPHILS NFR FLD MANUAL: 93 %
WBC # FLD: 858 /CU MM

## 2019-05-27 PROCEDURE — 87205 SMEAR GRAM STAIN: CPT

## 2019-05-27 PROCEDURE — 87070 CULTURE OTHR SPECIMN AEROBIC: CPT

## 2019-05-27 PROCEDURE — S0073 INJECTION, AZTREONAM, 500 MG: HCPCS | Performed by: SURGERY

## 2019-05-27 PROCEDURE — 63600175 PHARM REV CODE 636 W HCPCS: Performed by: RADIOLOGY

## 2019-05-27 PROCEDURE — 89051 BODY FLUID CELL COUNT: CPT

## 2019-05-27 PROCEDURE — 25000003 PHARM REV CODE 250: Performed by: SURGERY

## 2019-05-27 PROCEDURE — 63600175 PHARM REV CODE 636 W HCPCS: Performed by: SURGERY

## 2019-05-27 PROCEDURE — 11000001 HC ACUTE MED/SURG PRIVATE ROOM

## 2019-05-27 PROCEDURE — 87075 CULTR BACTERIA EXCEPT BLOOD: CPT

## 2019-05-27 RX ORDER — MIDAZOLAM HYDROCHLORIDE 1 MG/ML
INJECTION INTRAMUSCULAR; INTRAVENOUS CODE/TRAUMA/SEDATION MEDICATION
Status: COMPLETED | OUTPATIENT
Start: 2019-05-27 | End: 2019-05-27

## 2019-05-27 RX ORDER — SODIUM CHLORIDE, SODIUM LACTATE, POTASSIUM CHLORIDE, CALCIUM CHLORIDE 600; 310; 30; 20 MG/100ML; MG/100ML; MG/100ML; MG/100ML
INJECTION, SOLUTION INTRAVENOUS CONTINUOUS
Status: DISCONTINUED | OUTPATIENT
Start: 2019-05-27 | End: 2019-05-29 | Stop reason: HOSPADM

## 2019-05-27 RX ORDER — FENTANYL CITRATE 50 UG/ML
INJECTION, SOLUTION INTRAMUSCULAR; INTRAVENOUS CODE/TRAUMA/SEDATION MEDICATION
Status: COMPLETED | OUTPATIENT
Start: 2019-05-27 | End: 2019-05-27

## 2019-05-27 RX ADMIN — FAMOTIDINE 20 MG: 20 TABLET ORAL at 09:05

## 2019-05-27 RX ADMIN — METRONIDAZOLE 500 MG: 500 TABLET ORAL at 09:05

## 2019-05-27 RX ADMIN — FENTANYL CITRATE 50 MCG: 50 INJECTION, SOLUTION INTRAMUSCULAR; INTRAVENOUS at 02:05

## 2019-05-27 RX ADMIN — SODIUM CHLORIDE, SODIUM LACTATE, POTASSIUM CHLORIDE, AND CALCIUM CHLORIDE: .6; .31; .03; .02 INJECTION, SOLUTION INTRAVENOUS at 08:05

## 2019-05-27 RX ADMIN — CLONIDINE HYDROCHLORIDE 0.1 MG: 0.1 TABLET ORAL at 01:05

## 2019-05-27 RX ADMIN — HYDROCODONE BITARTRATE AND ACETAMINOPHEN 1 TABLET: 10; 325 TABLET ORAL at 09:05

## 2019-05-27 RX ADMIN — MIDAZOLAM HYDROCHLORIDE 1 MG: 1 INJECTION, SOLUTION INTRAMUSCULAR; INTRAVENOUS at 02:05

## 2019-05-27 RX ADMIN — SODIUM CHLORIDE, SODIUM LACTATE, POTASSIUM CHLORIDE, AND CALCIUM CHLORIDE: .6; .31; .03; .02 INJECTION, SOLUTION INTRAVENOUS at 09:05

## 2019-05-27 RX ADMIN — METRONIDAZOLE 500 MG: 500 TABLET ORAL at 01:05

## 2019-05-27 RX ADMIN — METRONIDAZOLE 500 MG: 500 TABLET ORAL at 06:05

## 2019-05-27 RX ADMIN — AZTREONAM 1000 MG: 1 INJECTION, POWDER, LYOPHILIZED, FOR SOLUTION INTRAMUSCULAR; INTRAVENOUS at 08:05

## 2019-05-27 RX ADMIN — ENOXAPARIN SODIUM 40 MG: 100 INJECTION SUBCUTANEOUS at 04:05

## 2019-05-27 RX ADMIN — AZTREONAM 1000 MG: 1 INJECTION, POWDER, LYOPHILIZED, FOR SOLUTION INTRAMUSCULAR; INTRAVENOUS at 03:05

## 2019-05-27 NOTE — PLAN OF CARE
Problem: Adult Inpatient Plan of Care  Goal: Plan of Care Review  Outcome: Ongoing (interventions implemented as appropriate)     05/27/19 7730   Plan of Care Review   Plan of Care Reviewed With patient   Patient free from fall or injury. No c/o pain. PO meds administered and tolerated well. Patient ambulating to restroom independently. Incisions to abdomen c/d/i open to air with staples intact. Safety maintained. Will continue to monitor.

## 2019-05-27 NOTE — CONSULTS
Inpatient Radiology Pre-procedure Note    History of Present Illness:  Ny Romeo is a 64 y.o. female admitted to [facility] with acute appendicitis complicated by perforation s/p laparoscopic appendectomy 5/20/19 complicated by post-op subjective fevers with f/u CT showing a 1.5 x 2.1 x 1.6-cm fluid collection at the appendectomy site that measures simple fluid density and additional 2.9 x 6.0 x 3.1-cm recto-vaginal fluid collection which also measures simple fluid density.    Given h/o recent surgical intervention with subsequent development of fevers, concern is for intra-peritoneal, abdominopelvic abscess formation.     A new inpatient IR consult placed to evaluate for potential image-guide placement of a percutaneous drainage catheter into the dominant collection.       Admission H&P reviewed.  History reviewed. No pertinent past medical history.  Past Surgical History:   Procedure Laterality Date    APPENDECTOMY, LAPAROSCOPIC N/A 5/20/2019    Performed by iPo Castillo III, MD at [facility] OR       Review of Systems:   As documented in primary team H&P    Home Meds:   Prior to Admission medications    Not on File     Scheduled Meds:    aztreonam  1,000 mg Intravenous Q8H    enoxaparin  40 mg Subcutaneous Daily    famotidine  20 mg Oral BID    metroNIDAZOLE  500 mg Oral Q8H     Continuous Infusions:    lactated ringers 100 mL/hr at 05/27/19 0847     PRN Meds:cloNIDine, HYDROcodone-acetaminophen, HYDROmorphone, ibuprofen  Anticoagulants/Antiplatelets: Plavix, not held by ordering MD    Allergies:   Review of patient's allergies indicates:   Allergen Reactions    Penicillins      Sedation Hx: have not been any systemic reactions    Labs:  No results for input(s): INR in the last 168 hours.    Invalid input(s):  PT,  PTT    Recent Labs   Lab 05/23/19  0459   WBC 7.80   HGB 11.0*   HCT 34.1*   MCV 91         Recent Labs   Lab 05/21/19  0435         K 4.0      CO2 25   BUN 13    CREATININE 0.8   CALCIUM 8.7         Vitals:  Temp: 99 °F (37.2 °C) (05/27/19 0736)  Pulse: 79 (05/27/19 0736)  Resp: 18 (05/27/19 0736)  BP: (!) 145/80 (05/27/19 0736)  SpO2: (!) 94 % (05/27/19 0736)     Physical Exam:  ASA: II  Mallampati: III    General: no acute distress  Mental Status: alert and oriented to person, place and time  HEENT: normocephalic, atraumatic  Chest: unlabored breathing  Heart: regular heart rate  Abdomen: +TTP  Extremity: moves all extremities    A/P:  64 y.o. F with acute appendicitis complicated by perforation s/p laparoscopic appendectomy complicated by post-op subjective fevers with f/u CT showing a 1.5 x 2.1 x 1.6-cm fluid collection at the appendectomy site that measures simple fluid density and additional 2.9 x 6.0 x 3.1-cm recto-vaginal fluid collection which also measures simple fluid density.    1. Will attempt CT-guided placement of a percutaneous drainage catheter into the dominant recto-vaginal space collection under moderate conscious sedation. Small size of the tavo-appendiceal collection prohibits drainage catheter placement.     Risks (including, but not limited to, pain, bleeding, infection, damage to nearby structures, failure to obtain sufficient material for a diagnosis, the need for additional procedures, and death), benefits, and alternatives were discussed with the patient. All questions were answered to the best of my abilities. The patient wishes to proceed with the procedure. Written informed consent was obtained.    Thank you for considering IR for the care of your patient.     Junaid Garcia MD  Interventional Radiology

## 2019-05-27 NOTE — PLAN OF CARE
Patient from home with spouse and will return at discharge. Patient will require PCP and surgery follow-up.           05/27/19 2961   Discharge Reassessment   Assessment Type Discharge Planning Reassessment   Provided patient/caregiver education on the expected discharge date and the discharge plan No   Do you have any problems affording any of your prescribed medications? No   Discharge Plan A Home with family  (Surgery F/U)   Discharge Plan B Home with family  (PCP F/U )   DME Needed Upon Discharge  none   Patient choice form signed by patient/caregiver N/A   Anticipated Discharge Disposition Home   Can the patient answer the patient profile reliably? Yes, cognitively intact   How does the patient rate their overall health at the present time? Good   Describe the patient's ability to walk at the present time. Minor restrictions or changes   How often would a person be available to care for the patient? Whenever needed

## 2019-05-27 NOTE — PROCEDURES
Radiology Post-Procedure Note    Pre Op Diagnosis: Pelvic abscess  Post Op Diagnosis: Same    Procedure: CT-guided placement of a percutaneous, left trans-gluteal drainage catheter    Procedure performed by: Junaid Garcia MD    Written Informed Consent Obtained: Yes  Specimen Removed: YES, 15-cc of serosanguinous thin fluid  Estimated Blood Loss: none    Findings:   Successful placement of a 10-Fr percutaneous, left trans-gluteal drainage catheter into a reto-vaginal space fluid collection under moderate conscious sedation. Patient tolerated the procedure well. No immediate post-procedural complications noted.       Junaid Garcia MD  Interventional Radiology

## 2019-05-27 NOTE — PROGRESS NOTES
"gen surg pod 7  Good appetite, min inc pain  Blood pressure 126/63, pulse 86, temperature 98.9 °F (37.2 °C), temperature source Oral, resp. rate 18, height 5' 7" (1.702 m), weight 83.9 kg (185 lb), SpO2 (!) 93 %, not currently breastfeeding.  abd soft, nl bs, nd, incs healing well, no redness or dc, appropriate inc tendernss  Ct report and films reviewed- d/w pt  A/p s/p lap appy for perf appendicitis- likely IAA,change cipro to aztreonam, consult IR for perc drainage  "

## 2019-05-28 LAB
GRAM STN SPEC: NORMAL
GRAM STN SPEC: NORMAL

## 2019-05-28 PROCEDURE — 63600175 PHARM REV CODE 636 W HCPCS: Performed by: SURGERY

## 2019-05-28 PROCEDURE — S0073 INJECTION, AZTREONAM, 500 MG: HCPCS | Performed by: SURGERY

## 2019-05-28 PROCEDURE — 25000003 PHARM REV CODE 250: Performed by: SURGERY

## 2019-05-28 PROCEDURE — 94761 N-INVAS EAR/PLS OXIMETRY MLT: CPT

## 2019-05-28 PROCEDURE — 11000001 HC ACUTE MED/SURG PRIVATE ROOM

## 2019-05-28 RX ADMIN — METRONIDAZOLE 500 MG: 500 TABLET ORAL at 03:05

## 2019-05-28 RX ADMIN — FAMOTIDINE 20 MG: 20 TABLET ORAL at 10:05

## 2019-05-28 RX ADMIN — METRONIDAZOLE 500 MG: 500 TABLET ORAL at 10:05

## 2019-05-28 RX ADMIN — METRONIDAZOLE 500 MG: 500 TABLET ORAL at 05:05

## 2019-05-28 RX ADMIN — ENOXAPARIN SODIUM 40 MG: 100 INJECTION SUBCUTANEOUS at 05:05

## 2019-05-28 RX ADMIN — CLONIDINE HYDROCHLORIDE 0.1 MG: 0.1 TABLET ORAL at 03:05

## 2019-05-28 RX ADMIN — AZTREONAM 1000 MG: 1 INJECTION, POWDER, LYOPHILIZED, FOR SOLUTION INTRAMUSCULAR; INTRAVENOUS at 12:05

## 2019-05-28 RX ADMIN — AZTREONAM 1000 MG: 1 INJECTION, POWDER, LYOPHILIZED, FOR SOLUTION INTRAMUSCULAR; INTRAVENOUS at 09:05

## 2019-05-28 RX ADMIN — HYDROCODONE BITARTRATE AND ACETAMINOPHEN 1 TABLET: 10; 325 TABLET ORAL at 10:05

## 2019-05-28 RX ADMIN — HYDROCODONE BITARTRATE AND ACETAMINOPHEN 1 TABLET: 10; 325 TABLET ORAL at 01:05

## 2019-05-28 RX ADMIN — AZTREONAM 1000 MG: 1 INJECTION, POWDER, LYOPHILIZED, FOR SOLUTION INTRAMUSCULAR; INTRAVENOUS at 03:05

## 2019-05-28 RX ADMIN — FAMOTIDINE 20 MG: 20 TABLET ORAL at 09:05

## 2019-05-28 NOTE — PROGRESS NOTES
"gen surg pod 8  Feels well, jayesh po, no abd pain  Perc drain w ss output  Pelvic fluid cx ngtd  Blood pressure (!) 155/78, pulse 80, temperature 98.6 °F (37 °C), temperature source Oral, resp. rate 18, height 5' 7" (1.702 m), weight 83.9 kg (185 lb), SpO2 (!) 93 %, not currently breastfeeding.  path 1.7cm adenocarcinoma, margins neg, five nodes neg--d/w pt at length  abd soft, nl bs, ntnd, incs healing well  A/p s/p lap appy/abd washout for perforated appendiceal cancer- cont abx for today- possible drain out and dc home tomorrow on po abx, pt aware will need cscope in near future and onc eval for poss chemo-also aware may need R hemicolecotmy, prob no benefit at this time for hyperthermic intraperitoneal chemo  "

## 2019-05-28 NOTE — PLAN OF CARE
Problem: Adult Inpatient Plan of Care  Goal: Plan of Care Review  Outcome: Ongoing (interventions implemented as appropriate)     05/28/19 7334   Plan of Care Review   Plan of Care Reviewed With patient;spouse   AAAOX4, pain managed with PRN pain medication. IVF infusing. Remains on PO and IV antibiotics as ordered. Patient ambulating to restroom with standby assist. Drain site c/d/i with minimal drainage. Safety maintained. Will continue to monitor.

## 2019-05-28 NOTE — PHYSICIAN QUERY
PT Name: Ny Romeo  MR #: [id number]     Physician Query Form - Documentation Clarification      CDS/: Brandy E Capley               Contact information: [email or web address]    This form is a permanent document in the medical record.     Query Date: May 28, 2019    By submitting this query, we are merely seeking further clarification of documentation. Please utilize your independent clinical judgment when addressing the question(s) below.    The Medical record reflects the following:    Supporting Clinical Findings Location in Medical Record     Ct report and films reviewed- d/w pt  A/p s/p lap appy for perf appendicitis- likely IAA,change cipro to aztreonam, consult IR for perc drainage     Surgery progress notes 5/27   Pre-procedure Diagnoses  Pelvic abscess in female     Post-procedure Diagnoses  Pelvic fluid collection     Pre Op Diagnosis: Pelvic abscess  Post Op Diagnosis: Same    Procedure: CT-guided placement of a percutaneous, left trans-gluteal drainage catheter    Specimen Removed: YES, 15-cc of serosanguinous thin fluid   IR procedures 5/27    Drainage    Fluid Appearance              Cloudy   Fluid Color                          Red   WBC, Body Fluid /cu mm 858              Comment: Reference ranges for body fluids not established.              Correlate clinically.   Segs, Fluid %                         93   Lymphs, Fluid %                         4   Monocytes/Macrophages, Fluid % 3     Specimen Type: Body Fluid  Specimen Source: Fluid  Collected: 5/27/2019  2:27 PM     Aerobic Bacterial Culture No growth   Specimen Type: Abscess  Specimen Source: Abdomen  Collected: 5/27/2019  2:27 PM    FINDINGS:  This patient is status post recent laparoscopic appendectomy 05/20/2019. There is a surgical staple line involving the cecum consistent with the recent appendectomy.  There is a small partially walled-off fluid collection adjacent to the surgical staple line in the pelvis measuring 2.4 x 2.4  cm.  Inferomedial to this small fluid collection is a complex structure that measures 3.5 x 2.8 cm.  This complex structure is indeterminate but appears it may be continuous with a 2nd fluid collection located posterior to the uterus that measures 7.2 x 3.3 cm.    WBC & Diff,Body Fluid Drainage 5/27                            Aerobic culture 5/27          CT 5/26                                                                                Doctor, Please specify diagnosis or diagnoses associated with above clinical findings.  Please specify pelvic fluid collection.     Provider Use Only       (  )  Abscess     ( x )  Other (please specify);  ______post op seroma___________                                                                                                               [  ] Clinically Undetermined

## 2019-05-28 NOTE — PHYSICIAN QUERY
PT Name: Ny Romeo  MR #: [id number]    Physician Query Form - Relationship to Procedure Clarification     CDS/: Brandy E Capley               Contact information: [email or web address]    This form is a permanent document in the medical record.     Query Date: May 28, 2019      By submitting this query, we are merely seeking further clarification of documentation. Please utilize your independent clinical judgment when addressing the question(s) below.    The Medical record contains the following:  Supporting Clinical Findings Location in Medical Record     Pre-procedure Diagnoses  Pelvic abscess in female      Post-procedure Diagnoses  Pelvic fluid collection      Pre Op Diagnosis: Pelvic abscess  Post Op Diagnosis: Same     Procedure: CT-guided placement of a percutaneous, left trans-gluteal drainage catheter     Specimen Removed: YES, 15-cc of serosanguinous thin fluid    FINDINGS:  This patient is status post recent laparoscopic appendectomy 05/20/2019. There is a surgical staple line involving the cecum consistent with the recent appendectomy.  There is a small partially walled-off fluid collection adjacent to the surgical staple line in the pelvis measuring 2.4 x 2.4 cm.  Inferomedial to this small fluid collection is a complex structure that measures 3.5 x 2.8 cm.  This complex structure is indeterminate but appears it may be continuous with a 2nd fluid collection located posterior to the uterus that measures 7.2 x 3.3 cm.     DATE OF PROCEDURE:  05/20/2019.  POSTOPERATIVE DIAGNOSIS:  Perforated appendicitis.  PROCEDURE:  Laparoscopic appendectomy/abdominal washout.    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, with invasion into lamina propria  - Proximal appendiceal staple margin uninvolved by carcinoma  - No lymphovascular invasion identified on sections examined  - Five lymph nodes negative for  metastatic carcinoma (0/5)  - Acute suppurative appendicitis and periappendicitis   IR procedures 5/27                          CT 5/26              Op note 5/20, filed 5/21        Surgical pathology 5/20       Please clarify if _pelvic fluid collection___ is      [ x ] Inherent/Integral to procedure   [  ] Present, but not a complication of the procedure   [  ] Complication of procedure   [  ] Other (please specify): __________________   [  ] Clinically Undetermined

## 2019-05-29 VITALS
BODY MASS INDEX: 29.03 KG/M2 | HEIGHT: 67 IN | TEMPERATURE: 99 F | DIASTOLIC BLOOD PRESSURE: 70 MMHG | SYSTOLIC BLOOD PRESSURE: 142 MMHG | HEART RATE: 86 BPM | RESPIRATION RATE: 18 BRPM | WEIGHT: 185 LBS | OXYGEN SATURATION: 95 %

## 2019-05-29 PROBLEM — C18.1 ADENOCARCINOMA OF APPENDIX: Status: ACTIVE | Noted: 2019-05-29

## 2019-05-29 PROCEDURE — 94761 N-INVAS EAR/PLS OXIMETRY MLT: CPT

## 2019-05-29 PROCEDURE — 25000003 PHARM REV CODE 250: Performed by: SURGERY

## 2019-05-29 PROCEDURE — S0073 INJECTION, AZTREONAM, 500 MG: HCPCS | Performed by: SURGERY

## 2019-05-29 RX ADMIN — AZTREONAM 1000 MG: 1 INJECTION, POWDER, LYOPHILIZED, FOR SOLUTION INTRAMUSCULAR; INTRAVENOUS at 12:05

## 2019-05-29 RX ADMIN — METRONIDAZOLE 500 MG: 500 TABLET ORAL at 05:05

## 2019-05-29 RX ADMIN — FAMOTIDINE 20 MG: 20 TABLET ORAL at 08:05

## 2019-05-29 RX ADMIN — SODIUM CHLORIDE, SODIUM LACTATE, POTASSIUM CHLORIDE, AND CALCIUM CHLORIDE: .6; .31; .03; .02 INJECTION, SOLUTION INTRAVENOUS at 03:05

## 2019-05-29 NOTE — PROGRESS NOTES
"gen surg pod 9  No abd pain, nl appeeite, having bms  Blood pressure (!) 152/84, pulse 86, temperature 98.4 °F (36.9 °C), temperature source Oral, resp. rate 18, height 5' 7" (1.702 m), weight 83.9 kg (185 lb), SpO2 95 %, not currently breastfeeding.  abd soft, nl bs, ntnd, incs healing well, drain w ss output-amount noted-drain completely removed by me  Fluid gram stain no orgs-.ngtd  A/p s/p lap appt for perforated appendiceal adenocarcinoma- dc sum dictated, inttra abd fluid appeared not infected to no abx at dc, dc sum dictated  [id number]  "

## 2019-05-29 NOTE — DISCHARGE SUMMARY
DATE OF ADMISSION:  05/20/2019    DATE OF DISCHARGE:  05/29/2019    DIAGNOSES:  Acute appendicitis, status post laparoscopic appendectomy for   perforated appendiceal adenocarcinoma.    HOSPITAL COURSE:  A 64-year-old female, seen in the emergency room with what   appeared to be appendicitis.  She was admitted, taken to the operating room,   where she was found to be perforated.  She had an appendectomy and her abdomen   was washed out.  Postoperatively, the patient did well.  She had some fevers.    Septic workup did not reveal any etiology.  A CT scan of the abdomen revealed a   fluid collection in her pelvis, which was presumed to be an abscess that was   drained percutaneously, but it appears to be a noninfected.  On postop day #9,   she is feeling well.  She is afebrile.  She has serosanguineous fluid from her   drain.  The drain was completely removed by me.  It appears she is ready for   discharge home.  I have reviewed her diagnoses with her and stressed the   importance of seeing an oncologist for possible chemotherapy.  She stressed the   importance of followup colonoscopy.  We will review the indications for   subsequent right hemicolectomy whether this is necessary.  She is aware of that.    So, on 05/29/2019, she is discharged home, status post laparoscopic   appendectomy and abdominal washout for perforated appendiceal adenocarcinoma.    CONDITION:  Good.    DIET:  Regular diet.    INSTRUCTIONS:  No heavy lifting for 3 weeks.  Clean the incisions once a day   with soap and water, dry dressing to former drain site.  She was given a   prescription for hydrocodone for pain.  She will follow up in my office in 1   week.  We will arrange followup with a [facility] for   colonoscopy and she has chosen see Dr. Gisselle Madera, an oncologist.  We will   arrange this followup also.    Discharge instructions were reviewed with the patient and  by me.      LISA/MESERET  dd: 05/29/2019 06:31:15  (DIMITRIT)  td: 05/29/2019 13:04:44 (SANDEEP)  Doc ID   #[id number]  Job ID #[id number]    CC:

## 2019-05-29 NOTE — PLAN OF CARE
"   05/29/19 1254   Final Note   Assessment Type Final Discharge Note   Anticipated Discharge Disposition Home   What phone number can be called within the next 1-3 days to see how you are doing after discharge?   (Listed in chart)   Hospital Follow Up  Appt(s) scheduled? Yes   Discharge plans and expectations educations in teach back method with documentation complete? Yes   Right Care Referral Info   Post Acute Recommendation No Care     TN reviewed follow up appointment information as well as  "Post op discharge instructions" handout with patient using teach back while informing patient to concentrate on signs and symptoms to look for after discharge that would flag her that she needs to contact the doctor. Patient is in agreement and verbalized an understanding. Placed discharge information in blue discharge folder.  TN also reviewed patient responsibility checklist with her using teach back. Patient was able to verbalize her responsibilities after discharge to manager her care at home being   1. Going to follow up appointments   2.  rx from the pharmacy when discharged  3. Taking her medication as prescribed     Patient's nurse, Nancy, informed that patient can discharge from  standpoint. Nurse can now complete discharge and review signs and symptoms teaching.   "

## 2019-05-29 NOTE — PROGRESS NOTES
Written Norco prescription given to pt's spouse to be filled prior to pt being discharge today at pt request

## 2019-05-29 NOTE — UM SECONDARY REVIEW
Other (see comment)  Committee LLOS approval criteria    IP Extended Stay > 10    LOS: approved an agreement with D/C plan   Discharge order written on day of review with discharge plan and location in place

## 2019-05-29 NOTE — PROGRESS NOTES
3221 TN scheduled an electronic appt with Dr. Madera, oncology follow-up, on 06/07/19 at 10:20 AM. TN contacted [facility] at [phone]; spoke with Marlyn; scheduled an appt on 06/12/19 at 11:00 AM.    TN met with pt and spouse to discuss appts. Pt and spouse wanted to change GI f/u to Dr. Montes. TN contacted [facility] and spoke with Donna; rescheduled with Dr. Montes on 06/13/19 at 10:15 AM.    TN informed floor nurse, Nancy, care management is complete and can proceed with discharge teaching.

## 2019-05-29 NOTE — PROGRESS NOTES
AAOX3  Denies any needs, no complaints of pain and no distress noted.  VSS.  IV dc'd.  DC and Rx instructions reviewed with pt and spouse.  Verbalized understanding.

## 2019-05-29 NOTE — PLAN OF CARE
Problem: Adult Inpatient Plan of Care  Goal: Plan of Care Review  Outcome: Ongoing (interventions implemented as appropriate)     05/29/19 0618   Plan of Care Review   Plan of Care Reviewed With patient   AOx4. Ambulates to bathroom with standby assistance. IVF LR at 100 continued. Remains free from falls. Drain site to L upper buttocks remains CDI, draining serosanguineous fluids 10cc. Drain to bulb suction throughout night. Flushed c 10cc NS. Staples to Lap sites remains CDI.Oral and IV abx continued per order.  Pain moderately managed with oral prn meds. B/P monitored. No distress noted.

## 2019-05-29 NOTE — PROGRESS NOTES
Follow-up Information     Pio Castillo III, MD On 6/4/2019.    Specialty:  Surgery  Why:  Outpatient Services General Surgery Tuesday at 2:00 PM  Contact information:  [address] [place] [address]  [phone]             Gisslele Madera MD On 6/7/2019.    Specialties:  Hematology and Oncology, Hematology  Why:  for appendiceal carcinoma, Outpatient Services Oncology Follow-Up Friday at 10:00 AM  Contact information:  [address]  [address]  [place] [address]  [phone]             Zackery Sullivan MD On 6/12/2019.    Specialty:  Gastroenterology  Why:  colonoscopy, Outpatient Services GI Follow-Up Wednesday at 11:00 AM  Contact information:  [address] [place] [address]  [address]  [facility]  [place] [address]  [phone]               PLEASE BRING TO ALL FOLLOW UP APPOINTMENTS:   1) A COPY YOUR DISCHARGE INSTRUCTIONS   2) ALL MEDICINES YOU ARE CURRENTLY TAKING IN THEIR ORIGINAL BOTTLES   3) IDENTIFICATION CARD   4) INSURANCE CARD    **PLEASE ARRIVE 15 MINUTES AHEAD OF SCHEDULED APPOINTMENT TIME   ++PLEASE CALL 24 HOURS IN ADVANCE IF YOU MUST RESCHEDULE YOUR APPOINTMENT DAY AND/OR TIME     [facility] CARE MANAGEMENT WRITTEN DISCHARGE INFORMATION    APPOINTMENTS AND RESOURCES TO HELP YOU MANAGE YOUR CARE AT HOME BASED ON YOUR PREFERENCES:  (If an appointment is not scheduled for you when you leave the hospital, call your doctor to schedule a follow up visit within a week)        Healthy Living Instructions to HELP MANAGE YOUR CARE AT HOME:  Things You are responsible for:  1.    Getting your prescriptions filled   2.    Taking your medications as directed, DO NOT MISS ANY DOSES!  3.    Following the diet and exercise recommended by your doctor  4.    Going to your follow-up doctor appointment. This is important because it allows the doctor to monitor your progress and determine if any changes need to made to your treatment plan.  5. If you have any questions  about MANAGING YOUR CARE AT HOME Call the Nurse Care Line for 24/7 Assistance [phone]       Please answer any calls you may receive from [facility]. We want to continue to support you as you manage your healthcare needs. [facility] is happy to have the opportunity to serve you.      Thank you for choosing [facility] for your healthcare needs!  Your [facility] Case Management Team,    DINORA Bills  Registered Nurse Transition Navigator  [phone]

## 2019-05-29 NOTE — PROGRESS NOTES
HOW TO MANAGE YOUR CARE  AT HOME:  TN taught Symptoms and Problems for POST OP home care with pt and SPOUSE/ALEX  with teach back:  1. Fever of 100.5 or higher, 2. Smelly drainage from incision, 3. Bright new blood from the incision. TN placed education sheet in [employer] Packet..       TN taught patient about things she is responsible for when discharged TO HELP WITH HER RECOVERY:  How to Manage her Care At Home:  1. Getting her prescriptions filled.  2. Taking her medications as directed. DO NOT MISS ANY DOSES!  3. Going to her follow-up doctor appointments.   .  Lisa Clayton RN, BSN, STN CCM

## 2019-05-31 LAB
BACTERIA SPEC AEROBE CULT: NO GROWTH
BACTERIA SPEC ANAEROBE CULT: NORMAL

## 2019-06-06 NOTE — PROGRESS NOTES
Subjective:       Patient ID: Ny Romeo is a 64 y.o. female.    Chief Complaint: No chief complaint on file.    HPI     REASON FOR CONSULTATION : Adenocarcinoma of appendix       Pt is a 65 y/o female with no significant past medical history seen today for  recently diagnosed adenocarcinoma of the appendix. She was admitted to [facility] on 5/20/2019  with acute appendicitis complicated by perforation s/p laparoscopic appendectomy 5/20/19. 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 Proximal Margins uninvolved.  No lymphovascular invasion. Five lymph nodes negative for neoplasm. Pathologic Stage Classification (pTNM, AJCC 8th Edition):Primary Tumor (pT): pT1 pN0 Hospital course complicated by post-op subjective fevers with f/u CT showing a 1.5 x 2.1 x 1.6-cm fluid collection at the appendectomy site that measures simple fluid density and additional 2.9 x 6.0 x 3.1-cm recto-vaginal fluid collection which also measures simple fluid density   which was presumed to be an abscess that was drained percutaneously, but it appeared to be a noninfected. She has never undergone a screening colonoscopy. She has close follow-up with GI in near future. She has no family history of GI cancers.  She has no had regular follow-up medical care. She has no PCP. She is overdue for screening mammography. She is here for further evaluation.          No past medical history on file.      Past Surgical History:   Procedure Laterality Date    APPENDECTOMY, LAPAROSCOPIC N/A 5/20/2019    Performed by Pio Castillo III, MD at [facility] OR     Review of Systems   Constitutional: Negative for appetite change, fatigue, fever and unexpected weight change.   HENT: Negative for mouth sores.    Eyes: Negative for visual disturbance.   Respiratory: Negative for cough and shortness of breath.    Cardiovascular: Negative for chest pain.   Gastrointestinal: Negative for abdominal pain and diarrhea.   Genitourinary:  "Negative for frequency.   Musculoskeletal: Negative for back pain.   Skin: Negative for rash.   Neurological: Negative for headaches.   Hematological: Negative for adenopathy.   Psychiatric/Behavioral: The patient is not nervous/anxious.        Objective:        Vitals:    06/07/19 0946 06/07/19 0951   BP: (!) 171/78 (!) 158/88   BP Location: Right arm    Patient Position: Sitting    Pulse: 96    Temp: 98.7 °F (37.1 °C)    TempSrc: Oral    SpO2: (!) 94%    Weight: 81.7 kg (180 lb 1.9 oz)    Height: 5' 6" (1.676 m)        Physical Exam   Constitutional: She is oriented to person, place, and time. She appears well-developed and well-nourished.   HENT:   Head: Normocephalic.   Mouth/Throat: Oropharynx is clear and moist. No oropharyngeal exudate.   Eyes: Pupils are equal, round, and reactive to light. Conjunctivae and lids are normal. No scleral icterus.   Neck: Normal range of motion. Neck supple. No thyromegaly present.   Cardiovascular: Normal rate, regular rhythm and normal heart sounds.   No murmur heard.  Pulmonary/Chest: Breath sounds normal. She has no wheezes. She has no rales.   Abdominal: Soft. Bowel sounds are normal. She exhibits no distension and no mass. There is no hepatosplenomegaly. There is no tenderness. There is no rebound and no guarding.   Musculoskeletal: Normal range of motion. She exhibits no edema or tenderness.   Lymphadenopathy:     She has no cervical adenopathy.     She has no axillary adenopathy.        Right: No supraclavicular adenopathy present.        Left: No supraclavicular adenopathy present.   Neurological: She is alert and oriented to person, place, and time. No cranial nerve deficit. Coordination normal.   Skin: Skin is warm and dry. No ecchymosis, no petechiae and no rash noted. No erythema.   Psychiatric: She has a normal mood and affect.           CT a/p w/contrast 5/26/2019   1.  Two partially loculated fluid collections are seen in the pelvis in this patient status post " recent laparoscopic appendectomy.  Abscess formation is within the differential.  There is a 3rd complex structure in-between the fluid collections that is in the expected location of the patient's right ovary when compared to the CT from 05/20/2019.  This could represent interval enlargement of the patient's right ovary secondary to local inflammation from the recent surgery and fluid collection formation.  Alternatively, this complex structure could just represent a complex 3rd fluid collection.    2.  Hepatic steatosis.    3.  A couple of tiny scattered pancreatic calcifications raise the possibility of chronic or remote pancreatitis.    4. Diverticulosis coli.        Appendix, appendectomy:  - Appendiceal adenocarcinoma, G1 (well-differentiated), 17mm in greatest dimension, with invasion into lamina  propria  - Proximal appendiceal staple margin uninvolved by carcinoma  - No lymphovascular invasion identified on sections examined  - Five lymph nodes negative for metastatic carcinoma (0/5)  - Acute suppurative appendicitis and periappendicitis  Note: Dr. DERRELL Castillo was informed of the findings on 5/27/19 at 1422 hours. Tumor is present on slides E and H  for further testing as needed.  CAP Summary: Appendix  Procedure: Appendectomy  Tumor Site: Proximal half of appendix  Base of appendix uninvolved by tumor  Tumor Size: 17 x 10 x 6mm  Histologic Type: Adenocarcinoma  Report        Histologic Grade: G1: Well differentiated  Tumor Extension: Tumor invades lamina propria or muscularis mucosa  Margins: Uninvolved by invasive carcinoma  Lymphovascular invasion: Not identified  Tumor Deposits: Not identified  Regional Lymph Nodes:  Number of lymph nodes involved: 0  Number of lymph nodes examined: 5  Pathologic Stage Classification (pTNM, AJCC 8th Edition):  Primary Tumor (pT): pT1: Tumor invades the submucosa (through the muscularis mucosa but not into the  muscularis propria)  Regional Lymph Nodes (pN): pN0: No  regional lymph node metastasis  Additional Pathologic Findings:  - Acute suppurative appendicitis and periappendicitis      Assessment:       1. Adenocarcinoma of appendix        Plan:       In summary 65 y/o female with recently diagnosed adenocarcinoma of appendix presenting in setting of acute appendicitis complicated by perforation status post laparoscopic appendectomy 5/20/2019. .Pathology showed Pathology showed appendiceal adenocarcinoma, G1 well -differentiated, 17mm. Proximal Margins uninvolved.  No lymphovascular invasion. Five lymph nodes negative for neoplasm. Pathologic Stage Classification (pTNM, AJCC 8th Edition):Primary Tumor (pT): pT1 pN0  Stage 1 iD1pV0M4. CT imaging a/p did not reveal any evidence of distant disease. CXR negative. Discussed pathology findings, staging and prognosis with patient. Discussed issue of right hemicolectomy in this setting and should be considered due to potential of invasion and dionisio metastases. Patient followed by surgery and advise consideration of surgical intervention with formal right hemicolectomy due to risk of dionisio metastases.We also discussed that oophorectomy , particularly if post menopausal also consideration due to high rate of mets in ovaries . We also discussed issue of adjuvant chemotherapy and that adjuvant chemotherapy entirely clear in this setting and often based on recommendations for adenocarcinomas of colon. In this setting,no strong indication for  Stage 1 disease , even in setting of perforation with stage 1 disease as these are often treated like adenocarcinomas of colon. Therefore, patient will follow-up with her treating surgeon. All questions posed answered to patient's satisfaction.    Thank you for allowing me to participate in the care of your patient.     Cc: Pio Castillo III, MD

## 2019-06-07 ENCOUNTER — INITIAL CONSULT (OUTPATIENT)
Dept: HEMATOLOGY/ONCOLOGY | Facility: CLINIC | Age: 64
End: 2019-06-07
Payer: COMMERCIAL

## 2019-06-07 ENCOUNTER — LAB VISIT (OUTPATIENT)
Dept: LAB | Facility: HOSPITAL | Age: 64
End: 2019-06-07
Attending: INTERNAL MEDICINE
Payer: COMMERCIAL

## 2019-06-07 VITALS
WEIGHT: 180.13 LBS | HEIGHT: 66 IN | BODY MASS INDEX: 28.95 KG/M2 | TEMPERATURE: 99 F | HEART RATE: 96 BPM | SYSTOLIC BLOOD PRESSURE: 158 MMHG | DIASTOLIC BLOOD PRESSURE: 88 MMHG | OXYGEN SATURATION: 94 %

## 2019-06-07 DIAGNOSIS — C18.1 ADENOCARCINOMA OF APPENDIX: Primary | ICD-10-CM

## 2019-06-07 DIAGNOSIS — C18.1 ADENOCARCINOMA OF APPENDIX: ICD-10-CM

## 2019-06-07 LAB
ALBUMIN SERPL BCP-MCNC: 4.3 G/DL (ref 3.5–5.2)
ALP SERPL-CCNC: 62 U/L (ref 55–135)
ALT SERPL W/O P-5'-P-CCNC: 27 U/L (ref 10–44)
ANION GAP SERPL CALC-SCNC: 10 MMOL/L (ref 8–16)
AST SERPL-CCNC: 34 U/L (ref 10–40)
BASOPHILS # BLD AUTO: 0.02 K/UL (ref 0–0.2)
BASOPHILS NFR BLD: 0.3 % (ref 0–1.9)
BILIRUB SERPL-MCNC: 0.3 MG/DL (ref 0.1–1)
BUN SERPL-MCNC: 17 MG/DL (ref 8–23)
CALCIUM SERPL-MCNC: 10.5 MG/DL (ref 8.7–10.5)
CANCER AG19-9 SERPL-ACNC: 10 U/ML (ref 2–40)
CEA SERPL-MCNC: 1.2 NG/ML (ref 0–5)
CHLORIDE SERPL-SCNC: 102 MMOL/L (ref 95–110)
CO2 SERPL-SCNC: 23 MMOL/L (ref 23–29)
CREAT SERPL-MCNC: 0.9 MG/DL (ref 0.5–1.4)
DIFFERENTIAL METHOD: ABNORMAL
EOSINOPHIL # BLD AUTO: 0.2 K/UL (ref 0–0.5)
EOSINOPHIL NFR BLD: 3 % (ref 0–8)
ERYTHROCYTE [DISTWIDTH] IN BLOOD BY AUTOMATED COUNT: 14 % (ref 11.5–14.5)
EST. GFR  (AFRICAN AMERICAN): >60 ML/MIN/1.73 M^2
EST. GFR  (NON AFRICAN AMERICAN): >60 ML/MIN/1.73 M^2
GLUCOSE SERPL-MCNC: 102 MG/DL (ref 70–110)
HCT VFR BLD AUTO: 44.9 % (ref 37–48.5)
HGB BLD-MCNC: 14.8 G/DL (ref 12–16)
LYMPHOCYTES # BLD AUTO: 3.5 K/UL (ref 1–4.8)
LYMPHOCYTES NFR BLD: 47.2 % (ref 18–48)
MCH RBC QN AUTO: 29.7 PG (ref 27–31)
MCHC RBC AUTO-ENTMCNC: 33 G/DL (ref 32–36)
MCV RBC AUTO: 90 FL (ref 82–98)
MONOCYTES # BLD AUTO: 0.8 K/UL (ref 0.3–1)
MONOCYTES NFR BLD: 10.4 % (ref 4–15)
NEUTROPHILS # BLD AUTO: 2.9 K/UL (ref 1.8–7.7)
NEUTROPHILS NFR BLD: 39.1 % (ref 38–73)
PLATELET # BLD AUTO: 407 K/UL (ref 150–350)
PMV BLD AUTO: 10.2 FL (ref 9.2–12.9)
POTASSIUM SERPL-SCNC: 4.4 MMOL/L (ref 3.5–5.1)
PROT SERPL-MCNC: 8.5 G/DL (ref 6–8.4)
RBC # BLD AUTO: 4.99 M/UL (ref 4–5.4)
SODIUM SERPL-SCNC: 135 MMOL/L (ref 136–145)
WBC # BLD AUTO: 7.39 K/UL (ref 3.9–12.7)

## 2019-06-07 PROCEDURE — 99205 OFFICE O/P NEW HI 60 MIN: CPT | Mod: S$GLB,,, | Performed by: INTERNAL MEDICINE

## 2019-06-07 PROCEDURE — 82378 CARCINOEMBRYONIC ANTIGEN: CPT

## 2019-06-07 PROCEDURE — 99999 PR PBB SHADOW E&M-EST. PATIENT-LVL III: ICD-10-PCS | Mod: PBBFAC,,, | Performed by: INTERNAL MEDICINE

## 2019-06-07 PROCEDURE — 99999 PR PBB SHADOW E&M-EST. PATIENT-LVL III: CPT | Mod: PBBFAC,,, | Performed by: INTERNAL MEDICINE

## 2019-06-07 PROCEDURE — 99205 PR OFFICE/OUTPT VISIT, NEW, LEVL V, 60-74 MIN: ICD-10-PCS | Mod: S$GLB,,, | Performed by: INTERNAL MEDICINE

## 2019-06-07 PROCEDURE — 36415 COLL VENOUS BLD VENIPUNCTURE: CPT

## 2019-06-07 PROCEDURE — 85025 COMPLETE CBC W/AUTO DIFF WBC: CPT

## 2019-06-07 PROCEDURE — 80053 COMPREHEN METABOLIC PANEL: CPT

## 2019-06-07 PROCEDURE — 86301 IMMUNOASSAY TUMOR CA 19-9: CPT

## 2019-08-27 ENCOUNTER — TELEPHONE (OUTPATIENT)
Dept: UROLOGY | Facility: CLINIC | Age: 64
End: 2019-08-27

## 2019-08-27 NOTE — TELEPHONE ENCOUNTER
Placed on sx schedule and notified Elizabeth at [facility]. Patient can be consented that day per Dr. North.

## 2019-08-27 NOTE — TELEPHONE ENCOUNTER
----- Message from Lisa Aiken sent at 8/27/2019 12:53 PM CDT -----  Type: Patient Call Back    Who called: Elizabeth with Dr. Castillo      What is the request in detail: Rep calling to schedule a procedure with staff. -Bilateral Urethra Stent on 09/12    Can the clinic reply by [facility]? No     Would the patient rather a call back or a response via [facility]? Call back     Best call back number:[phone] please ask for Elizabeth     Additional Information: she states this is very important

## 2019-09-06 ENCOUNTER — HOSPITAL ENCOUNTER (OUTPATIENT)
Dept: PREADMISSION TESTING | Facility: HOSPITAL | Age: 64
Discharge: HOME | End: 2019-09-06
Attending: SURGERY
Payer: COMMERCIAL

## 2019-09-06 VITALS
HEART RATE: 81 BPM | WEIGHT: 190.5 LBS | DIASTOLIC BLOOD PRESSURE: 89 MMHG | OXYGEN SATURATION: 98 % | BODY MASS INDEX: 29.9 KG/M2 | TEMPERATURE: 97 F | SYSTOLIC BLOOD PRESSURE: 148 MMHG | HEIGHT: 67 IN | RESPIRATION RATE: 18 BRPM

## 2019-09-06 DIAGNOSIS — Z01.818 PREOP TESTING: Primary | ICD-10-CM

## 2019-09-06 LAB
ANION GAP SERPL CALC-SCNC: 10 MMOL/L (ref 8–16)
BASOPHILS # BLD AUTO: 0.02 K/UL (ref 0–0.2)
BASOPHILS NFR BLD: 0.3 % (ref 0–1.9)
BUN SERPL-MCNC: 13 MG/DL (ref 8–23)
CALCIUM SERPL-MCNC: 10.7 MG/DL (ref 8.7–10.5)
CEA SERPL-MCNC: 1.3 NG/ML (ref 0–5)
CHLORIDE SERPL-SCNC: 105 MMOL/L (ref 95–110)
CO2 SERPL-SCNC: 28 MMOL/L (ref 23–29)
CREAT SERPL-MCNC: 1 MG/DL (ref 0.5–1.4)
DIFFERENTIAL METHOD: NORMAL
EOSINOPHIL # BLD AUTO: 0.3 K/UL (ref 0–0.5)
EOSINOPHIL NFR BLD: 4.3 % (ref 0–8)
ERYTHROCYTE [DISTWIDTH] IN BLOOD BY AUTOMATED COUNT: 14 % (ref 11.5–14.5)
EST. GFR  (AFRICAN AMERICAN): >60 ML/MIN/1.73 M^2
EST. GFR  (NON AFRICAN AMERICAN): >60 ML/MIN/1.73 M^2
GLUCOSE SERPL-MCNC: 113 MG/DL (ref 70–110)
HCT VFR BLD AUTO: 43.7 % (ref 37–48.5)
HGB BLD-MCNC: 14.5 G/DL (ref 12–16)
LYMPHOCYTES # BLD AUTO: 3 K/UL (ref 1–4.8)
LYMPHOCYTES NFR BLD: 41.9 % (ref 18–48)
MCH RBC QN AUTO: 29.4 PG (ref 27–31)
MCHC RBC AUTO-ENTMCNC: 33.2 G/DL (ref 32–36)
MCV RBC AUTO: 89 FL (ref 82–98)
MONOCYTES # BLD AUTO: 0.5 K/UL (ref 0.3–1)
MONOCYTES NFR BLD: 7.5 % (ref 4–15)
NEUTROPHILS # BLD AUTO: 3.3 K/UL (ref 1.8–7.7)
NEUTROPHILS NFR BLD: 46 % (ref 38–73)
PLATELET # BLD AUTO: 216 K/UL (ref 150–350)
PMV BLD AUTO: 10.6 FL (ref 9.2–12.9)
POTASSIUM SERPL-SCNC: 5.2 MMOL/L (ref 3.5–5.1)
RBC # BLD AUTO: 4.94 M/UL (ref 4–5.4)
SODIUM SERPL-SCNC: 143 MMOL/L (ref 136–145)
WBC # BLD AUTO: 7.18 K/UL (ref 3.9–12.7)

## 2019-09-06 PROCEDURE — 85025 COMPLETE CBC W/AUTO DIFF WBC: CPT

## 2019-09-06 PROCEDURE — 82378 CARCINOEMBRYONIC ANTIGEN: CPT

## 2019-09-06 PROCEDURE — 80048 BASIC METABOLIC PNL TOTAL CA: CPT

## 2019-09-06 PROCEDURE — 36415 COLL VENOUS BLD VENIPUNCTURE: CPT

## 2019-09-06 NOTE — DISCHARGE INSTRUCTIONS
"Your procedure  is scheduled for __9/12/2019________.    Call [phone] between 2pm and 5pm on _9/11/2019______to find out your arrival time for the day of surgery.    Report to Same Day Surgery Unit at ____ AM on the 2nd floor of the hospital.  Use the front entrance of the hospital.  The front doors of the hospital open promptly at 5:30am.  If you need wheelchair assistance, call [phone] from your cell phone, or call "0" from the courtesy phone in the lobby.    Important instructions:   Do not eat or drink after 12 midnight, including water.  It is okay to brush your teeth.  Do not have gum, candy or mints.     Take only these medications with a small swallow of water on the morning of your surgery ___none___________        Stop taking Aspirin, Ibuprofen, Motrin and Aleve , Fish oil, and Vitamin E for at least 7 days before your surgery. You may use Tylenol unless otherwise instructed by your doctor.             Prep instructions:  Bowel prep as instructed by surgeon.     Please shower the night before and the morning of your surgery.        Use Hibiclens soap as instructed by your pre op nurse.   Please place clean linens on your bed the night before surgery. Please wear fresh clean clothing after each shower.     No shaving of procedural area at least 4-5 days before surgery due to increased risk of skin irritation and/or possible infection.      You may be asked to take a third shower on arrival to Same Day Surgery depending on the type of surgery you are having.     Do not wear make- up, including mascara.     You may wear deodorant only.      Do not wear powder, body lotion or perfume/cologne.     Do not wear any jewelry or have any metal on your body.     You will be asked to remove any dentures or partials for the procedure.     Please bring any documents given to you by your doctor.     If you are going home on the same day of surgery, you must arrange for a family member or a friend to drive " you home.  Public transportation is prohibited.  You will not be able to drive home if you were given anesthesia or sedation.     Wear loose fitting clothes allowing for bandages.     Please leave money and valuables home.       You may bring your cell phone.     Call the doctor if fever or illness should occur before your surgery.    Call [phone] to contact us here if needed.

## 2019-09-11 ENCOUNTER — ANESTHESIA EVENT (OUTPATIENT)
Dept: SURGERY | Facility: HOSPITAL | Age: 64
DRG: 331 | End: 2019-09-11
Payer: COMMERCIAL

## 2019-09-12 ENCOUNTER — HOSPITAL ENCOUNTER (INPATIENT)
Facility: HOSPITAL | Age: 64
LOS: 6 days | Discharge: HOME | DRG: 331 | End: 2019-09-18
Attending: SURGERY | Admitting: SURGERY
Payer: COMMERCIAL

## 2019-09-12 ENCOUNTER — ANESTHESIA (OUTPATIENT)
Dept: SURGERY | Facility: HOSPITAL | Age: 64
DRG: 331 | End: 2019-09-12
Payer: COMMERCIAL

## 2019-09-12 DIAGNOSIS — C18.1 MALIGNANT NEOPLASM OF APPENDIX: Primary | ICD-10-CM

## 2019-09-12 LAB
ABO + RH BLD: NORMAL
BLD GP AB SCN CELLS X3 SERPL QL: NORMAL

## 2019-09-12 PROCEDURE — 88341 TISSUE SPECIMEN TO PATHOLOGY - SURGERY: ICD-10-PCS | Mod: 26,,, | Performed by: PATHOLOGY

## 2019-09-12 PROCEDURE — 25000003 PHARM REV CODE 250: Performed by: SURGERY

## 2019-09-12 PROCEDURE — 37000008 HC ANESTHESIA 1ST 15 MINUTES: Performed by: SURGERY

## 2019-09-12 PROCEDURE — 63600175 PHARM REV CODE 636 W HCPCS: Performed by: SURGERY

## 2019-09-12 PROCEDURE — 37000009 HC ANESTHESIA EA ADD 15 MINS: Performed by: SURGERY

## 2019-09-12 PROCEDURE — 88342 IMHCHEM/IMCYTCHM 1ST ANTB: CPT | Performed by: PATHOLOGY

## 2019-09-12 PROCEDURE — C1769 GUIDE WIRE: HCPCS | Performed by: SURGERY

## 2019-09-12 PROCEDURE — 88304 TISSUE EXAM BY PATHOLOGIST: CPT | Mod: 26,,, | Performed by: PATHOLOGY

## 2019-09-12 PROCEDURE — 11000001 HC ACUTE MED/SURG PRIVATE ROOM

## 2019-09-12 PROCEDURE — 71000033 HC RECOVERY, INTIAL HOUR: Performed by: SURGERY

## 2019-09-12 PROCEDURE — 63600175 PHARM REV CODE 636 W HCPCS: Performed by: NURSE ANESTHETIST, CERTIFIED REGISTERED

## 2019-09-12 PROCEDURE — 36000710: Performed by: SURGERY

## 2019-09-12 PROCEDURE — 63600175 PHARM REV CODE 636 W HCPCS: Performed by: ANESTHESIOLOGY

## 2019-09-12 PROCEDURE — 27201423 OPTIME MED/SURG SUP & DEVICES STERILE SUPPLY: Performed by: SURGERY

## 2019-09-12 PROCEDURE — 88342 TISSUE SPECIMEN TO PATHOLOGY - SURGERY: ICD-10-PCS | Mod: 26,,, | Performed by: PATHOLOGY

## 2019-09-12 PROCEDURE — 25000003 PHARM REV CODE 250: Performed by: NURSE ANESTHETIST, CERTIFIED REGISTERED

## 2019-09-12 PROCEDURE — 94761 N-INVAS EAR/PLS OXIMETRY MLT: CPT

## 2019-09-12 PROCEDURE — 52005 PR CYSTOURETHROSCOPY,URETER CATHETER: ICD-10-PCS | Mod: ,,, | Performed by: UROLOGY

## 2019-09-12 PROCEDURE — 88342 IMHCHEM/IMCYTCHM 1ST ANTB: CPT | Mod: 26,,, | Performed by: PATHOLOGY

## 2019-09-12 PROCEDURE — S0030 INJECTION, METRONIDAZOLE: HCPCS | Performed by: SURGERY

## 2019-09-12 PROCEDURE — D9220A PRA ANESTHESIA: Mod: CRNA,,, | Performed by: NURSE ANESTHETIST, CERTIFIED REGISTERED

## 2019-09-12 PROCEDURE — D9220A PRA ANESTHESIA: ICD-10-PCS | Mod: ANES,,, | Performed by: ANESTHESIOLOGY

## 2019-09-12 PROCEDURE — 94799 UNLISTED PULMONARY SVC/PX: CPT

## 2019-09-12 PROCEDURE — 99900037 HC PT THERAPY SCREENING (STAT)

## 2019-09-12 PROCEDURE — S0028 INJECTION, FAMOTIDINE, 20 MG: HCPCS | Performed by: NURSE ANESTHETIST, CERTIFIED REGISTERED

## 2019-09-12 PROCEDURE — 88341 IMHCHEM/IMCYTCHM EA ADD ANTB: CPT | Mod: 26,,, | Performed by: PATHOLOGY

## 2019-09-12 PROCEDURE — 52005 CYSTO W/URTRL CATHJ: CPT | Mod: ,,, | Performed by: UROLOGY

## 2019-09-12 PROCEDURE — D9220A PRA ANESTHESIA: ICD-10-PCS | Mod: CRNA,,, | Performed by: NURSE ANESTHETIST, CERTIFIED REGISTERED

## 2019-09-12 PROCEDURE — 71000039 HC RECOVERY, EACH ADD'L HOUR: Performed by: SURGERY

## 2019-09-12 PROCEDURE — 88307 TISSUE EXAM BY PATHOLOGIST: CPT | Performed by: PATHOLOGY

## 2019-09-12 PROCEDURE — D9220A PRA ANESTHESIA: Mod: ANES,,, | Performed by: ANESTHESIOLOGY

## 2019-09-12 PROCEDURE — 88307 TISSUE SPECIMEN TO PATHOLOGY - SURGERY: ICD-10-PCS | Mod: 26,,, | Performed by: PATHOLOGY

## 2019-09-12 PROCEDURE — 36000711: Performed by: SURGERY

## 2019-09-12 PROCEDURE — 36415 COLL VENOUS BLD VENIPUNCTURE: CPT

## 2019-09-12 PROCEDURE — 25500020 PHARM REV CODE 255: Performed by: UROLOGY

## 2019-09-12 PROCEDURE — 86901 BLOOD TYPING SEROLOGIC RH(D): CPT

## 2019-09-12 PROCEDURE — 88304 TISSUE SPECIMEN TO PATHOLOGY - SURGERY: ICD-10-PCS | Mod: 26,,, | Performed by: PATHOLOGY

## 2019-09-12 PROCEDURE — 88307 TISSUE EXAM BY PATHOLOGIST: CPT | Mod: 26,,, | Performed by: PATHOLOGY

## 2019-09-12 RX ORDER — ONDANSETRON 2 MG/ML
4 INJECTION INTRAMUSCULAR; INTRAVENOUS DAILY PRN
Status: DISCONTINUED | OUTPATIENT
Start: 2019-09-12 | End: 2019-09-12 | Stop reason: HOSPADM

## 2019-09-12 RX ORDER — SODIUM CHLORIDE, SODIUM LACTATE, POTASSIUM CHLORIDE, CALCIUM CHLORIDE 600; 310; 30; 20 MG/100ML; MG/100ML; MG/100ML; MG/100ML
INJECTION, SOLUTION INTRAVENOUS CONTINUOUS PRN
Status: DISCONTINUED | OUTPATIENT
Start: 2019-09-12 | End: 2019-09-12

## 2019-09-12 RX ORDER — METOCLOPRAMIDE HYDROCHLORIDE 5 MG/ML
INJECTION INTRAMUSCULAR; INTRAVENOUS
Status: DISCONTINUED | OUTPATIENT
Start: 2019-09-12 | End: 2019-09-12

## 2019-09-12 RX ORDER — HYDROMORPHONE HYDROCHLORIDE 2 MG/ML
1 INJECTION, SOLUTION INTRAMUSCULAR; INTRAVENOUS; SUBCUTANEOUS
Status: DISCONTINUED | OUTPATIENT
Start: 2019-09-12 | End: 2019-09-14

## 2019-09-12 RX ORDER — SODIUM CHLORIDE 0.9 % (FLUSH) 0.9 %
10 SYRINGE (ML) INJECTION
Status: DISCONTINUED | OUTPATIENT
Start: 2019-09-12 | End: 2019-09-12 | Stop reason: HOSPADM

## 2019-09-12 RX ORDER — METRONIDAZOLE 500 MG/100ML
500 INJECTION, SOLUTION INTRAVENOUS
Status: COMPLETED | OUTPATIENT
Start: 2019-09-12 | End: 2019-09-12

## 2019-09-12 RX ORDER — ROCURONIUM BROMIDE 10 MG/ML
INJECTION, SOLUTION INTRAVENOUS
Status: DISCONTINUED | OUTPATIENT
Start: 2019-09-12 | End: 2019-09-12

## 2019-09-12 RX ORDER — LIDOCAINE HCL/PF 100 MG/5ML
SYRINGE (ML) INTRAVENOUS
Status: DISCONTINUED | OUTPATIENT
Start: 2019-09-12 | End: 2019-09-12

## 2019-09-12 RX ORDER — MIDAZOLAM HYDROCHLORIDE 1 MG/ML
INJECTION, SOLUTION INTRAMUSCULAR; INTRAVENOUS
Status: DISCONTINUED | OUTPATIENT
Start: 2019-09-12 | End: 2019-09-12

## 2019-09-12 RX ORDER — FAMOTIDINE 20 MG/1
20 TABLET, FILM COATED ORAL 2 TIMES DAILY
Status: DISCONTINUED | OUTPATIENT
Start: 2019-09-12 | End: 2019-09-18 | Stop reason: HOSPADM

## 2019-09-12 RX ORDER — GLYCOPYRROLATE 0.2 MG/ML
INJECTION INTRAMUSCULAR; INTRAVENOUS
Status: DISCONTINUED | OUTPATIENT
Start: 2019-09-12 | End: 2019-09-12

## 2019-09-12 RX ORDER — BUPIVACAINE HYDROCHLORIDE AND EPINEPHRINE 2.5; 5 MG/ML; UG/ML
INJECTION, SOLUTION EPIDURAL; INFILTRATION; INTRACAUDAL; PERINEURAL
Status: DISCONTINUED | OUTPATIENT
Start: 2019-09-12 | End: 2019-09-12 | Stop reason: HOSPADM

## 2019-09-12 RX ORDER — FAMOTIDINE 10 MG/ML
INJECTION INTRAVENOUS
Status: DISCONTINUED | OUTPATIENT
Start: 2019-09-12 | End: 2019-09-12

## 2019-09-12 RX ORDER — FENTANYL CITRATE 50 UG/ML
INJECTION, SOLUTION INTRAMUSCULAR; INTRAVENOUS
Status: DISCONTINUED | OUTPATIENT
Start: 2019-09-12 | End: 2019-09-12

## 2019-09-12 RX ORDER — PROPOFOL 10 MG/ML
VIAL (ML) INTRAVENOUS
Status: DISCONTINUED | OUTPATIENT
Start: 2019-09-12 | End: 2019-09-12

## 2019-09-12 RX ORDER — METRONIDAZOLE 500 MG/100ML
500 INJECTION, SOLUTION INTRAVENOUS
Status: COMPLETED | OUTPATIENT
Start: 2019-09-12 | End: 2019-09-13

## 2019-09-12 RX ORDER — CIPROFLOXACIN 2 MG/ML
400 INJECTION, SOLUTION INTRAVENOUS
Status: COMPLETED | OUTPATIENT
Start: 2019-09-12 | End: 2019-09-12

## 2019-09-12 RX ORDER — SODIUM CHLORIDE, SODIUM LACTATE, POTASSIUM CHLORIDE, CALCIUM CHLORIDE 600; 310; 30; 20 MG/100ML; MG/100ML; MG/100ML; MG/100ML
INJECTION, SOLUTION INTRAVENOUS CONTINUOUS
Status: DISCONTINUED | OUTPATIENT
Start: 2019-09-12 | End: 2019-09-13

## 2019-09-12 RX ORDER — HYDROCODONE BITARTRATE AND ACETAMINOPHEN 10; 325 MG/1; MG/1
1 TABLET ORAL EVERY 4 HOURS PRN
Status: DISCONTINUED | OUTPATIENT
Start: 2019-09-12 | End: 2019-09-18 | Stop reason: HOSPADM

## 2019-09-12 RX ORDER — HYDROMORPHONE HYDROCHLORIDE 2 MG/ML
0.2 INJECTION, SOLUTION INTRAMUSCULAR; INTRAVENOUS; SUBCUTANEOUS EVERY 5 MIN PRN
Status: DISCONTINUED | OUTPATIENT
Start: 2019-09-12 | End: 2019-09-12 | Stop reason: HOSPADM

## 2019-09-12 RX ORDER — ENOXAPARIN SODIUM 100 MG/ML
40 INJECTION SUBCUTANEOUS EVERY 24 HOURS
Status: DISCONTINUED | OUTPATIENT
Start: 2019-09-13 | End: 2019-09-18 | Stop reason: HOSPADM

## 2019-09-12 RX ORDER — PHENYLEPHRINE HYDROCHLORIDE 10 MG/ML
INJECTION INTRAVENOUS
Status: DISCONTINUED | OUTPATIENT
Start: 2019-09-12 | End: 2019-09-12

## 2019-09-12 RX ORDER — ONDANSETRON 2 MG/ML
INJECTION INTRAMUSCULAR; INTRAVENOUS
Status: DISCONTINUED | OUTPATIENT
Start: 2019-09-12 | End: 2019-09-12

## 2019-09-12 RX ORDER — NEOSTIGMINE METHYLSULFATE 1 MG/ML
INJECTION, SOLUTION INTRAVENOUS
Status: DISCONTINUED | OUTPATIENT
Start: 2019-09-12 | End: 2019-09-12

## 2019-09-12 RX ORDER — ACETAMINOPHEN 10 MG/ML
1000 INJECTION, SOLUTION INTRAVENOUS ONCE
Status: COMPLETED | OUTPATIENT
Start: 2019-09-12 | End: 2019-09-12

## 2019-09-12 RX ADMIN — PHENYLEPHRINE HYDROCHLORIDE 200 MCG: 10 INJECTION INTRAVENOUS at 07:09

## 2019-09-12 RX ADMIN — SODIUM CHLORIDE, SODIUM LACTATE, POTASSIUM CHLORIDE, AND CALCIUM CHLORIDE: .6; .31; .03; .02 INJECTION, SOLUTION INTRAVENOUS at 08:09

## 2019-09-12 RX ADMIN — SODIUM CHLORIDE, SODIUM LACTATE, POTASSIUM CHLORIDE, AND CALCIUM CHLORIDE: .6; .31; .03; .02 INJECTION, SOLUTION INTRAVENOUS at 11:09

## 2019-09-12 RX ADMIN — FENTANYL CITRATE 25 MCG: 50 INJECTION INTRAMUSCULAR; INTRAVENOUS at 08:09

## 2019-09-12 RX ADMIN — PHENYLEPHRINE HYDROCHLORIDE 100 MCG: 10 INJECTION INTRAVENOUS at 08:09

## 2019-09-12 RX ADMIN — HYDROMORPHONE HYDROCHLORIDE 0.2 MG: 2 INJECTION, SOLUTION INTRAMUSCULAR; INTRAVENOUS; SUBCUTANEOUS at 10:09

## 2019-09-12 RX ADMIN — METRONIDAZOLE 500 MG: 500 INJECTION, SOLUTION INTRAVENOUS at 11:09

## 2019-09-12 RX ADMIN — PHENYLEPHRINE HYDROCHLORIDE 200 MCG: 10 INJECTION INTRAVENOUS at 08:09

## 2019-09-12 RX ADMIN — FENTANYL CITRATE 50 MCG: 50 INJECTION INTRAMUSCULAR; INTRAVENOUS at 09:09

## 2019-09-12 RX ADMIN — CIPROFLOXACIN 400 MG: 2 INJECTION, SOLUTION INTRAVENOUS at 05:09

## 2019-09-12 RX ADMIN — METRONIDAZOLE 500 MG: 500 INJECTION, SOLUTION INTRAVENOUS at 07:09

## 2019-09-12 RX ADMIN — MIDAZOLAM HYDROCHLORIDE 2 MG: 1 INJECTION, SOLUTION INTRAMUSCULAR; INTRAVENOUS at 07:09

## 2019-09-12 RX ADMIN — PROPOFOL 140 MG: 10 INJECTION, EMULSION INTRAVENOUS at 07:09

## 2019-09-12 RX ADMIN — ROCURONIUM BROMIDE 10 MG: 10 INJECTION, SOLUTION INTRAVENOUS at 08:09

## 2019-09-12 RX ADMIN — SODIUM CHLORIDE, SODIUM LACTATE, POTASSIUM CHLORIDE, AND CALCIUM CHLORIDE: .6; .31; .03; .02 INJECTION, SOLUTION INTRAVENOUS at 05:09

## 2019-09-12 RX ADMIN — NEOSTIGMINE METHYLSULFATE 4 MG: 1 INJECTION INTRAVENOUS at 08:09

## 2019-09-12 RX ADMIN — ACETAMINOPHEN 1000 MG: 10 INJECTION, SOLUTION INTRAVENOUS at 09:09

## 2019-09-12 RX ADMIN — FAMOTIDINE 20 MG: 20 TABLET ORAL at 11:09

## 2019-09-12 RX ADMIN — METRONIDAZOLE 500 MG: 500 INJECTION, SOLUTION INTRAVENOUS at 03:09

## 2019-09-12 RX ADMIN — FAMOTIDINE 20 MG: 20 TABLET ORAL at 08:09

## 2019-09-12 RX ADMIN — FENTANYL CITRATE 50 MCG: 50 INJECTION INTRAMUSCULAR; INTRAVENOUS at 07:09

## 2019-09-12 RX ADMIN — GLYCOPYRROLATE 0.7 MG: 0.2 INJECTION, SOLUTION INTRAMUSCULAR; INTRAVENOUS at 08:09

## 2019-09-12 RX ADMIN — HYDROMORPHONE HYDROCHLORIDE 1 MG: 2 INJECTION, SOLUTION INTRAMUSCULAR; INTRAVENOUS; SUBCUTANEOUS at 11:09

## 2019-09-12 RX ADMIN — GLYCOPYRROLATE 0.2 MG: 0.2 INJECTION, SOLUTION INTRAMUSCULAR; INTRAVENOUS at 07:09

## 2019-09-12 RX ADMIN — ROCURONIUM BROMIDE 50 MG: 10 INJECTION, SOLUTION INTRAVENOUS at 07:09

## 2019-09-12 RX ADMIN — SODIUM CHLORIDE, SODIUM LACTATE, POTASSIUM CHLORIDE, AND CALCIUM CHLORIDE: .6; .31; .03; .02 INJECTION, SOLUTION INTRAVENOUS at 06:09

## 2019-09-12 RX ADMIN — LIDOCAINE HYDROCHLORIDE 100 MG: 20 INJECTION, SOLUTION INTRAVENOUS at 07:09

## 2019-09-12 RX ADMIN — ONDANSETRON 4 MG: 2 INJECTION, SOLUTION INTRAMUSCULAR; INTRAVENOUS at 08:09

## 2019-09-12 RX ADMIN — FAMOTIDINE 20 MG: 10 INJECTION, SOLUTION INTRAVENOUS at 07:09

## 2019-09-12 RX ADMIN — PHENYLEPHRINE HYDROCHLORIDE 100 MCG: 10 INJECTION INTRAVENOUS at 07:09

## 2019-09-12 RX ADMIN — CIPROFLOXACIN 400 MG: 2 INJECTION, SOLUTION INTRAVENOUS at 06:09

## 2019-09-12 RX ADMIN — METOCLOPRAMIDE 10 MG: 5 INJECTION, SOLUTION INTRAMUSCULAR; INTRAVENOUS at 07:09

## 2019-09-12 RX ADMIN — HYDROCODONE BITARTRATE AND ACETAMINOPHEN 1 TABLET: 10; 325 TABLET ORAL at 03:09

## 2019-09-12 NOTE — TRANSFER OF CARE
"Anesthesia Transfer of Care Note    Patient: Ny Romeo    Procedure(s) Performed: Procedure(s) (LRB):  COLECTOMY, RIGHT, LAPAROSCOPIC (Right)  CYSTOSCOPY, WITH URETERAL STENT INSERTION (Bilateral)    Patient location: PACU    Anesthesia Type: general    Transport from OR: Transported from OR on room air with adequate spontaneous ventilation    Post pain: adequate analgesia    Post assessment: no apparent anesthetic complications    Post vital signs: stable    Level of consciousness: sedated and responds to stimulation    Nausea/Vomiting: no nausea/vomiting    Complications: none    Transfer of care protocol was followed      Last vitals:   Visit Vitals  /64 (BP Location: Right arm)   Pulse 80   Temp 36.3 °C (97.4 °F) (Oral)   Resp 18   Ht 5' 6.5" (1.689 m)   Wt 86.4 kg (190 lb 7.6 oz)   SpO2 97%   Breastfeeding? No   BMI 30.28 kg/m²     "

## 2019-09-12 NOTE — PT/OT/SLP PROGRESS
Physical Therapy Screen      Patient Name:  Ny Romeo   MRN:  [id number]    Patient not seen today secondary to Pt declined to participate today.  Pt lives with  in a townhouse with B HR's on stairs up to 2nd floor.  Pt was independent with ambulation and ADL's PTA without DME.  Will follow-up 09/13/2019.    Larissa Bishop, PT

## 2019-09-12 NOTE — BRIEF OP NOTE
[facility]  Brief Operative Note    SUMMARY     Surgery Date: 9/12/2019     Surgeon(s) and Role:       * MAEVE North MD - Primary    Assisting Surgeon: None    Pre-op Diagnosis:  Malignant neoplasm of appendix [C18.1]    Post-op Diagnosis:  Post-Op Diagnosis Codes:     * Malignant neoplasm of appendix [C18.1]    Procedure(s) (LRB):    CYSTOSCOPY, WITH URETERAL STENT INSERTION (Bilateral)    Anesthesia: General    Description of Procedure: bilateral ureteral catheters placed to 20 cm without difficulty    Description of the findings of the procedure: as above    Estimated Blood Loss: * No values recorded between 9/12/2019  7:33 AM and 9/12/2019  7:41 AM *         Specimens:   Specimen (12h ago, onward)    None

## 2019-09-12 NOTE — NURSING
Patient arrived to unit calm and alert accompanied with family.  Patient's family is requesting a private; notified charge.  IVF continued. Patient states pain 8 out of 10.  Will continue to monitor.

## 2019-09-12 NOTE — ANESTHESIA PREPROCEDURE EVALUATION
2019  Ny Romeo is a 64 y.o., female.  Pre-operative evaluation for Procedure(s) (LRB):  COLECTOMY, RIGHT, LAPAROSCOPIC (Right)  CYSTOSCOPY, WITH URETERAL STENT INSERTION (Bilateral)    Ny Romeo is a 64 y.o. female     Patient Active Problem List   Diagnosis    Acute perforated appendicitis    Appendicitis    Adenocarcinoma of appendix    Malignant neoplasm of appendix       Review of patient's allergies indicates:   Allergen Reactions    Penicillins        No current facility-administered medications on file prior to encounter.      No current outpatient medications on file prior to encounter.       Past Surgical History:   Procedure Laterality Date    APPENDECTOMY, LAPAROSCOPIC N/A 2019    Performed by Pio Castillo III, MD at [facility] OR     SECTION      TONSILLECTOMY         Social History     Socioeconomic History    Marital status:      Spouse name: Not on file    Number of children: Not on file    Years of education: Not on file    Highest education level: Not on file   Occupational History    Not on file   Social Needs    Financial resource strain: Not on file    Food insecurity:     Worry: Not on file     Inability: Not on file    Transportation needs:     Medical: Not on file     Non-medical: Not on file   Tobacco Use    Smoking status: Former Smoker     Types: Cigarettes     Last attempt to quit: 2017     Years since quittin.6    Smokeless tobacco: Never Used   Substance and Sexual Activity    Alcohol use: Yes     Alcohol/week: 0.6 oz     Types: 1 Cans of beer per week     Comment: socially    Drug use: No    Sexual activity: Yes     Partners: Male   Lifestyle    Physical activity:     Days per week: Not on file     Minutes per session: Not on file    Stress: Not on file   Relationships    Social connections:     Talks on  phone: Not on file     Gets together: Not on file     Attends Denominational service: Not on file     Active member of club or organization: Not on file     Attends meetings of clubs or organizations: Not on file     Relationship status: Not on file   Other Topics Concern    Not on file   Social History Narrative    Not on file         CBC: No results for input(s): WBC, RBC, HGB, HCT, PLT, MCV, MCH, MCHC in the last 72 hours.    CMP: No results for input(s): NA, K, CL, CO2, BUN, CREATININE, GLU, MG, PHOS, CALCIUM, ALBUMIN, PROT, ALKPHOS, ALT, AST, BILITOT in the last 72 hours.    INR  No results for input(s): PT, INR, PROTIME, APTT in the last 72 hours.        Diagnostic Studies:      EKD Echo:  No results found for this or any previous visit.    Anesthesia Evaluation    I have reviewed the Patient Summary Reports.    I have reviewed the Nursing Notes.   I have reviewed the Medications.     Review of Systems  Anesthesia Hx:  No problems with previous Anesthesia  History of prior surgery of interest to airway management or planning: Previous anesthesia: General Denies Family Hx of Anesthesia complications.   Denies Personal Hx of Anesthesia complications.   Social:  Former Smoker, Social Alcohol Use    Hematology/Oncology:  Hematology Normal      Current/Recent Cancer. (colon cancer for colectomy today. Found cancer on Appendectomy path.) surgery   EENT/Dental:   Oral thrush   Cardiovascular:   Exercise tolerance: good    Pulmonary:  Pulmonary Normal    Renal/:  Renal/ Normal     Hepatic/GI:   GERD, well controlled    Musculoskeletal:  Musculoskeletal Normal    Neurological:  Neurology Normal    Endocrine:  Endocrine Normal    Dermatological:  Skin Normal    Psych:  Psychiatric Normal           Physical Exam  General:  Obesity    Airway/Jaw/Neck:  Airway Findings: Mouth Opening: Small, but > 3cm Tongue: Normal  General Airway Assessment: Adult  Mallampati: III  TM Distance: 4 - 6 cm         Eyes/Ears/Nose:  EYES/EARS/NOSE FINDINGS: Normal   Dental:  Dental Findings: In tact   Chest/Lungs:  Chest/Lungs Clear    Heart/Vascular:  Heart Findings: Normal Heart murmur: negative       Mental Status:  Mental Status Findings: Normal        Anesthesia Plan  Type of Anesthesia, risks & benefits discussed:  Anesthesia Type:  general  Patient's Preference:   Intra-op Monitoring Plan: standard ASA monitors  Intra-op Monitoring Plan Comments:   Post Op Pain Control Plan: multimodal analgesia  Post Op Pain Control Plan Comments:   Induction:   IV  Beta Blocker:  Patient is not currently on a Beta-Blocker (No further documentation required).       Informed Consent: Patient understands risks and agrees with Anesthesia plan.  Questions answered. Anesthesia consent signed with patient.  ASA Score: 2     Day of Surgery Review of History & Physical:  There are no significant changes.          Ready For Surgery From Anesthesia Perspective.

## 2019-09-12 NOTE — OP NOTE
gen surg op note  Pre op dx cecal mass, h/o appendiceal cancer  Post op dx same  Proc lap R colecotmy  anesth gen  ebl min  Findings dictated  Specimen R colon;prodecict of anastamosis  Anna/aj  [id number]

## 2019-09-12 NOTE — OP NOTE
DATE OF PROCEDURE:  09/12/2019.    PREOPERATIVE DIAGNOSES:  History of appendiceal cancer, cecal mass.    POSTOPERATIVE DIAGNOSES:  History of appendiceal cancer, cecal mass.    PROCEDURE:  Hand-assisted laparoscopic right hemicolectomy.    SURGEON:  Pio Rose M.D.    ASSISTANT:  Bryan Cassidy M.D.    ANESTHESIA:  General.    BLOOD LOSS:  Minimal.    CLINICAL HISTORY:  A 64-year-old female who previously had perforated   appendicitis treated surgically.  Her pathology specimen revealed an appendiceal   cancer.  She has subsequently undergone a colonoscopy, which reveals a   suspicious mass in the cecum.  Biopsies were benign.  She is consented for a   right hemicolectomy.  Note, the patient had bilateral ureteral stents placed and   dictated separately by Dr. Chaz North.    PROCEDURE IN DETAIL:  The patient was brought into the Operating Room and placed   on the operating table in supine position.  The abdomen was prepped and draped   in sterile fashion.  A 7 cm incision was made in the midline just above and   below the umbilicus.  Electrocautery was used to dissect through the   subcutaneous tissue down to the fascia, which was incised for the length of the   incision.  Upon entering the abdomen, there were no adhesions here.  Manual   inspection of the abdomen revealed a mass palpable in the cecum consistent with   endoscopy.  There were no masses in the liver.  There was no sign of   carcinomatosis.  With my hand, protecting the underlying bowel, a 10 mm trocar   was placed in the upper midline and a 5 mm trocar was placed in the left upper   abdomen.  The Gelport was inserted.  Pneumoperitoneum was instituted.  I   inserted my hand through the Gelport.  Upon examination of the abdomen, there   was no sign of carcinomatosis.  There were no significant adhesions from her   previous surgery.  The liver was smooth and healthy.  There were no masses.  The   transverse colon was grasped.  The plane  between this and the stomach was   developed with the LigaSure device.  This was carried around the proximal   transverse colon and then down the ascending colon and around the cecum to   completely mobilize the right colon.  The right ureter was repeatedly palpated   and kept out of harm's way.  The duodenum was identified and was not damaged.    Once it had been completely mobilized, it was exteriorized through the midline   incision with the wound protector kept.  The terminal ileum was transected 10 cm   proximal to the ileocecal valve with the LUKE stapling device and the transverse   colon just proximal to the middle colic vessel was transected as well with the   GI stapling device.  Next, the LigaSure was used to take down the mesentery at   its base.  There was no damage to the duodenum.  There was no back bleeding from   the mesentery.  This was sent as right colon.  A side-to-side anastomosis was   created by aligning the small bowel adjacent to the transverse colon.  A small   enterotomy was made in each.  The LUKE-75 stapling device had each limb placed   down 1 lumen of the bowel.  It was closed, held for 1 minute, fired and   withdrawn.  A 3-0 Vicryl and zipping stitch was placed.  The staple line was   inspected and was intact.  There was no bleeding.  The common enterotomy and two   previous staple lines were resected with a TA 60 stapling device.  The staple   line was oversewn with 3-0 silk figure-of-eight sutures and a piece of omentum   was laid over this as well and secured in place.  The anastomosis was about 4 cm   in length.  Gastrointestinal contents easily moved across the anastomosis and   before placing the omentum.  All the staple lines were inspected and were   intact.  There was no leakage.  Both ends of the bowel were well perfused.    There was no spillage of gastrointestinal contents.  Next, with retraction, the   right side of the abdomen and the retroperitoneal region was  meticulously   inspected and was found to be hemostatic.  There were no enterotomies.  The   bowel was placed back into the abdomen.  The midline fascia defect was closed   with interrupted #1 Vicryl sutures.  A total of 50 mL of Exparel were injected   into the abdominal wall and subcutaneous tissue of the incisions.  The wounds   were irrigated with normal saline.  The midline incision was closed with   interrupted 3-0 nylon and staples.  The other 2 incisions were closed with   staples.  The patient tolerated the procedure well.      LISA/MESERET  dd: 09/12/2019 09:06:16 (CDT)  td: 09/12/2019 09:32:59 (CDT)  Doc ID   #[id number]  Job ID #[id number]    CC:

## 2019-09-12 NOTE — OP NOTE
Date of Procedure: 09/12/2019     Preoperative Diagnosis:  Appendiceal carcinoma    Postoperative Diagnosis:  Appendiceal carcinoma    Primary Surgeon: MAEVE North MD    Anesthesia:  General    Procedure Performed:  Cystoscopy, bilateral ureteral catheter placement, Cueva catheter placement    Estimated Blood Loss:  None    Drains:  5 Afghan open-ended catheters bilaterally, 16 Afghan Cueva catheter    Specimens Removed:  None    Complications:  None    Indications: Ny Romeo is a 64-year-old woman with a history of appendiceal cancer.  She is here today for a partial colectomy with General surgery.  They have asked for ureteral catheter placement for better ureteral identification.    Procedure in Detail:    Ny Romeo was taken to the operating room where she was positively identified by denise.  She has placed supine the operating room table.  Following induction of adequate general anesthesia she was placed in the dorsal lithotomy position and her external genitalia were prepped and draped in usual sterile fashion.    Preoperative time-out was performed as well as confirmation of preoperative antibiotics.    A 22 Afghan rigid cystoscope was then passed per urethra into the bladder under direct vision.  The bladder was inspected there were no tumor seen no lesions seen no evidence of any inflammation.    Both ureteral orifices were identified there seen to be in orthotopic position.    Then passed a 0.035 in Bentson wire through the scope intubating the right ureteral orifice the wire passed up easily.    I then passed a 5 Afghan open-ended catheter over the Bentson wire up to 20 cm the wire was withdrawn as well as the scope leaving the catheter in place.    I then reintroduced the cystoscope and passed the Bentson wire through the scope intubating the left ureteral orifice the wire passed up easily.    I then passed a 2nd open-ended catheter over the Bentson wire up to 20 cm and then  withdrew the wire and scope leaving the catheter in place.  The catheter on the left side was marked by cutting it at an angle.    I then passed a 16 Vietnamese Cueva catheter without difficulty into the bladder and left this to gravity drainage. The open-ended catheters were then secured to the Cueva catheter individually with 0 silk ties.    The care of Mrs. Romeo was then transferred back to Dr. Castillo.

## 2019-09-12 NOTE — PLAN OF CARE
Problem: Adult Inpatient Plan of Care  Goal: Plan of Care Review  Outcome: Ongoing (interventions implemented as appropriate)     09/12/19 5627   Plan of Care Review   Plan of Care Reviewed With patient   Progress improving     Patient oriented x 4 and cooperative.  IVF continued and IV antibiotics infused as ordered.  Abdominal incision dressing dry and intact w/ scant shadowing.  Frequent checks made. Pain managed w/ prn oral and IV med according to pain scale.  Spouse at bedside.  NPO w/ exception to Ice chips prn.  Will continue to monitor.

## 2019-09-13 PROCEDURE — 11000001 HC ACUTE MED/SURG PRIVATE ROOM

## 2019-09-13 PROCEDURE — 99232 PR SUBSEQUENT HOSPITAL CARE,LEVL II: ICD-10-PCS | Mod: ,,, | Performed by: UROLOGY

## 2019-09-13 PROCEDURE — 97110 THERAPEUTIC EXERCISES: CPT

## 2019-09-13 PROCEDURE — 94799 UNLISTED PULMONARY SVC/PX: CPT

## 2019-09-13 PROCEDURE — 63600175 PHARM REV CODE 636 W HCPCS: Performed by: SURGERY

## 2019-09-13 PROCEDURE — 99900035 HC TECH TIME PER 15 MIN (STAT)

## 2019-09-13 PROCEDURE — 94761 N-INVAS EAR/PLS OXIMETRY MLT: CPT

## 2019-09-13 PROCEDURE — 97161 PT EVAL LOW COMPLEX 20 MIN: CPT

## 2019-09-13 PROCEDURE — 25000003 PHARM REV CODE 250: Performed by: SURGERY

## 2019-09-13 PROCEDURE — 99232 SBSQ HOSP IP/OBS MODERATE 35: CPT | Mod: ,,, | Performed by: UROLOGY

## 2019-09-13 RX ORDER — DEXTROSE MONOHYDRATE, SODIUM CHLORIDE, AND POTASSIUM CHLORIDE 50; 1.49; 4.5 G/1000ML; G/1000ML; G/1000ML
INJECTION, SOLUTION INTRAVENOUS CONTINUOUS
Status: DISCONTINUED | OUTPATIENT
Start: 2019-09-13 | End: 2019-09-17

## 2019-09-13 RX ADMIN — HYDROCODONE BITARTRATE AND ACETAMINOPHEN 1 TABLET: 10; 325 TABLET ORAL at 11:09

## 2019-09-13 RX ADMIN — DEXTROSE MONOHYDRATE, SODIUM CHLORIDE, AND POTASSIUM CHLORIDE: 50; 4.5; 1.49 INJECTION, SOLUTION INTRAVENOUS at 09:09

## 2019-09-13 RX ADMIN — SODIUM CHLORIDE, SODIUM LACTATE, POTASSIUM CHLORIDE, AND CALCIUM CHLORIDE: .6; .31; .03; .02 INJECTION, SOLUTION INTRAVENOUS at 03:09

## 2019-09-13 RX ADMIN — DEXTROSE MONOHYDRATE, SODIUM CHLORIDE, AND POTASSIUM CHLORIDE: 50; 4.5; 1.49 INJECTION, SOLUTION INTRAVENOUS at 08:09

## 2019-09-13 RX ADMIN — FAMOTIDINE 20 MG: 20 TABLET ORAL at 08:09

## 2019-09-13 RX ADMIN — HYDROCODONE BITARTRATE AND ACETAMINOPHEN 1 TABLET: 10; 325 TABLET ORAL at 07:09

## 2019-09-13 RX ADMIN — ENOXAPARIN SODIUM 40 MG: 100 INJECTION SUBCUTANEOUS at 05:09

## 2019-09-13 NOTE — ANESTHESIA POSTPROCEDURE EVALUATION
Anesthesia Post Evaluation    Patient: Ny Romeo    Procedure(s) Performed: Procedure(s) (LRB):  COLECTOMY, RIGHT, LAPAROSCOPIC (Right)  CYSTOSCOPY, WITH URETERAL STENT INSERTION (Bilateral)    Final Anesthesia Type: general  Patient location during evaluation: PACU  Patient participation: Yes- Able to Participate  Level of consciousness: awake and alert  Post-procedure vital signs: reviewed and stable  Pain management: adequate  Airway patency: patent  PONV status at discharge: No PONV  Anesthetic complications: no      Cardiovascular status: blood pressure returned to baseline  Respiratory status: spontaneous ventilation  Hydration status: euvolemic  Follow-up not needed.          Vitals Value Taken Time   /74 9/13/2019 11:17 AM   Temp 36.8 °C (98.3 °F) 9/13/2019 11:17 AM   Pulse 87 9/13/2019 11:17 AM   Resp 19 9/13/2019 11:17 AM   SpO2 95 % 9/13/2019 11:17 AM         Event Time     Out of Recovery 10:27:26          Pain/Porsha Score: Pain Rating Prior to Med Admin: 8 (9/13/2019 11:34 AM)  Pain Rating Post Med Admin: 0 (9/12/2019  4:13 PM)  Porsha Score: 9 (9/12/2019 10:13 AM)

## 2019-09-13 NOTE — PLAN OF CARE
Problem: Adult Inpatient Plan of Care  Goal: Plan of Care Review  Outcome: Ongoing (interventions implemented as appropriate)     09/13/19 1525   Plan of Care Review   Plan of Care Reviewed With patient   Progress improving     Patient oriented x 4 and cooperative.  IVF changed as ordered.  Abdominal incision dressing dry and intact w/ scant shadowing. Cueva d/c'd and voiding w/o difficulty.  Frequent checks made. Pain managed w/ prn oral med according to pain scale.  Spouse at bedside.  Advanced diet to clears and tolerating w/o problem.  Will continue to monitor.

## 2019-09-13 NOTE — SUBJECTIVE & OBJECTIVE
Interval History: catheters removed post op.  Cueva about to be removed.  No  complaints.    Review of Systems   Constitutional: Negative.    HENT: Negative.    Eyes: Negative.    Respiratory: Negative for cough, chest tightness and shortness of breath.    Cardiovascular: Negative for chest pain.   Gastrointestinal: Negative.  Negative for constipation, diarrhea and nausea.   Genitourinary: Positive for hematuria. Negative for flank pain.   Musculoskeletal: Negative.    Neurological: Negative.    Psychiatric/Behavioral: Negative.      Objective:     Temp:  [97.9 °F (36.6 °C)-99.9 °F (37.7 °C)] 98.3 °F (36.8 °C)  Pulse:  [84-92] 87  Resp:  [17-20] 19  SpO2:  [95 %-98 %] 95 %  BP: (135-158)/(64-74) 158/74     Body mass index is 30.28 kg/m².    Date 09/13/19 0700 - 09/14/19 0659   Shift 2302-3339 1243-1749 3867-5770 24 Hour Total   INTAKE   I.V.(mL/kg) 246.3(2.9)   246.3(2.9)   Shift Total(mL/kg) 246.3(2.9)   246.3(2.9)   OUTPUT   Shift Total(mL/kg)       Weight (kg) 86.4 86.4 86.4 86.4          Drains          None          Physical Exam   Nursing note and vitals reviewed.  Constitutional: She is oriented to person, place, and time. She appears well-developed.   HENT:   Head: Normocephalic.   Eyes: Conjunctivae are normal.   Neck: Normal range of motion. No tracheal deviation present. No thyromegaly present.   Cardiovascular: Normal rate, normal heart sounds and normal pulses.    Pulmonary/Chest: Effort normal and breath sounds normal. No respiratory distress. She has no wheezes.   Abdominal: Soft. She exhibits no distension and no mass. There is no hepatosplenomegaly. There is no tenderness. There is no rebound, no guarding and no CVA tenderness. No hernia.   Genitourinary:   Genitourinary Comments: Cueva in place with slight blood tinged urine   Musculoskeletal: Normal range of motion. She exhibits no edema or tenderness.   Lymphadenopathy:     She has no cervical adenopathy.   Neurological: She is alert and  oriented to person, place, and time.   Skin: Skin is warm and dry. No rash noted. No erythema.     Psychiatric: She has a normal mood and affect. Her behavior is normal. Judgment and thought content normal.       Significant Labs:    BMP:  No results for input(s): NA, K, CL, CO2, BUN, CREATININE, LABGLOM, GLUCOSE, CALCIUM in the last 168 hours.    CBC:   No results for input(s): WBC, HGB, HCT, PLT in the last 168 hours.    Blood Culture: No results for input(s): LABBLOO in the last 168 hours.  Urine Culture: No results for input(s): LABURIN in the last 168 hours.    Significant Imaging:

## 2019-09-13 NOTE — PROGRESS NOTES
gen surg pod 1  Op findings d/w pt  Pt examined and chart reviewed  Doing well post op  Tonny dang  Will dc elvis  Case d/w Dr Nicole this am

## 2019-09-13 NOTE — PROGRESS NOTES
[facility]  Urology  Progress Note    Patient Name: Ny Romeo  MRN: [id number]  Admission Date: 9/12/2019  Hospital Length of Stay: 1 days  Code Status: Full Code   Attending Provider: Pio Castillo III,*   Primary Care Physician: Primary Doctor No    Subjective:     HPI:  No notes on file    Interval History: catheters removed post op.  Cueva about to be removed.  No  complaints.    Review of Systems   Constitutional: Negative.    HENT: Negative.    Eyes: Negative.    Respiratory: Negative for cough, chest tightness and shortness of breath.    Cardiovascular: Negative for chest pain.   Gastrointestinal: Negative.  Negative for constipation, diarrhea and nausea.   Genitourinary: Positive for hematuria. Negative for flank pain.   Musculoskeletal: Negative.    Neurological: Negative.    Psychiatric/Behavioral: Negative.      Objective:     Temp:  [97.9 °F (36.6 °C)-99.9 °F (37.7 °C)] 98.3 °F (36.8 °C)  Pulse:  [84-92] 87  Resp:  [17-20] 19  SpO2:  [95 %-98 %] 95 %  BP: (135-158)/(64-74) 158/74     Body mass index is 30.28 kg/m².    Date 09/13/19 0700 - 09/14/19 0659   Shift [id number] 3530-6503 8234-7031 24 Hour Total   INTAKE   I.V.(mL/kg) 246.3(2.9)   246.3(2.9)   Shift Total(mL/kg) 246.3(2.9)   246.3(2.9)   OUTPUT   Shift Total(mL/kg)       Weight (kg) 86.4 86.4 86.4 86.4          Drains          None          Physical Exam   Nursing note and vitals reviewed.  Constitutional: She is oriented to person, place, and time. She appears well-developed.   HENT:   Head: Normocephalic.   Eyes: Conjunctivae are normal.   Neck: Normal range of motion. No tracheal deviation present. No thyromegaly present.   Cardiovascular: Normal rate, normal heart sounds and normal pulses.    Pulmonary/Chest: Effort normal and breath sounds normal. No respiratory distress. She has no wheezes.   Abdominal: Soft. She exhibits no distension and no mass. There is no hepatosplenomegaly. There is no tenderness.  There is no rebound, no guarding and no CVA tenderness. No hernia.   Genitourinary:   Genitourinary Comments: Cueva in place with slight blood tinged urine   Musculoskeletal: Normal range of motion. She exhibits no edema or tenderness.   Lymphadenopathy:     She has no cervical adenopathy.   Neurological: She is alert and oriented to person, place, and time.   Skin: Skin is warm and dry. No rash noted. No erythema.     Psychiatric: She has a normal mood and affect. Her behavior is normal. Judgment and thought content normal.       Significant Labs:    BMP:  No results for input(s): NA, K, CL, CO2, BUN, CREATININE, LABGLOM, GLUCOSE, CALCIUM in the last 168 hours.    CBC:   No results for input(s): WBC, HGB, HCT, PLT in the last 168 hours.    Blood Culture: No results for input(s): LABBLOO in the last 168 hours.  Urine Culture: No results for input(s): LABURIN in the last 168 hours.    Significant Imaging:                    Assessment/Plan:     * Malignant neoplasm of appendix  Cueva to be removed  Will sign off, call with questions        VTE Risk Mitigation (From admission, onward)        Ordered     enoxaparin injection 40 mg  Daily      09/12/19 1047     Place sequential compression device  Until discontinued      09/12/19 1047          W Chaz North MD  Urology  [facility]

## 2019-09-13 NOTE — PLAN OF CARE
"SW met with patient to complete discharge needs assessment. SW reviewed with patient contents of "[facility] Packet" including "help at home", "things patient responsible for to manage her health at home" and "preferences". Patient was able to verbalize her help at home is her spouse Bryan. SW discussed with patient the things she will be responsible for to manage her health at home would be going on her doctor appointments, taking medications as prescribed, and getting prescriptions filled. SW wrote name and phone number on white communication board. Patient prefers afternoon doctor appointments.          [id number] - [place], [place] - [address]  [address]  [place] [address]  Phone: [phone] Fax: [phone]           09/13/19 0299   Discharge Assessment   Assessment Type Discharge Planning Assessment   Confirmed/corrected address and phone number on facesheet? Yes   Assessment information obtained from? Patient   Communicated expected length of stay with patient/caregiver no   Prior to hospitilization cognitive status: Alert/Oriented;No Deficits   Prior to hospitalization functional status: Independent   Current cognitive status: Alert/Oriented;No Deficits   Current Functional Status: Independent   Facility Arrived From:   (Home)   Lives With spouse   Able to Return to Prior Arrangements yes   Is patient able to care for self after discharge? Yes   Who are your caregiver(s) and their phone number(s)? Bryan (spouse) [phone]   Patient's perception of discharge disposition home or selfcare;home health   Readmission Within the Last 30 Days no previous admission in last 30 days   Patient currently being followed by outpatient case management? No   Patient currently receives any other outside agency services? No   Equipment Currently Used at Home none   Do you have any problems affording any of your prescribed medications? No   Is the patient taking " medications as prescribed? yes   Does the patient have transportation home? Yes   Transportation Anticipated car, drives self;family or friend will provide   Dialysis Name and Scheduled days   (N/A)   Does the patient receive services at the [facility]mad[facility]? No   Discharge Plan A Home with family  (Surgery F/U)   Discharge Plan B Home with family   DME Needed Upon Discharge  none   Patient/Family in Agreement with Plan yes

## 2019-09-13 NOTE — PLAN OF CARE
Problem: Physical Therapy Goal  Goal: Physical Therapy Goal  Goals to be met by: 2019     Patient will increase functional independence with mobility by performin. Supine to sit with Modified [place]  2. Sit to stand transfer with Modified [place]  3. Gait  x 200 feet with Modified [place] with or without AD.   4. Ascend/descend 10 stair with HR and Minimal Assistance    5. Lower extremity exercise program x10 reps per handout, with supervision    Outcome: Ongoing (interventions implemented as appropriate)  Initial PT evaluation performed.  Pt could benefit from Daily skilled PT services in order to maximize function prior to D/C.  HHPT recommended

## 2019-09-13 NOTE — PROGRESS NOTES
ESME contacted Dr. Castillo office to schedule surgery follow-up, ESME spoke to Gerri, appointment scheduled for 9/26/19 @ 1:40pm.

## 2019-09-13 NOTE — PROGRESS NOTES
Surgery Progress Note    Primary Problem: Malignant neoplasm of appendix    Other problems:   Active Hospital Problems    Diagnosis  POA    *Malignant neoplasm of appendix [C18.1]  Yes      Resolved Hospital Problems   No resolved problems to display.       HD #  LOS: 1 day   POD #1 Day Post-Op status post laparoscopic right hemicolectomy    Overnight events:  No acute events overnight.  No nausea vomiting.      Diet - Diet NPO Except for: Medication, Ice Chips Well tolerated by patient.    I/O last 3 completed shifts:  In: 2818.8 [P.O.:300; I.V.:2418.8; IV Piggyback:100]  Out: 1074 [Urine:1074]    Intake/Output Summary (Last 24 hours) at 9/13/2019 0706  Last data filed at 9/12/2019 2314  Gross per 24 hour   Intake 2818.75 ml   Output 1074 ml   Net 1744.75 ml       Temp:  [96 °F (35.6 °C)-99.9 °F (37.7 °C)] 99.8 °F (37.7 °C)  Pulse:  [59-89] 89  Resp:  [17-20] 18  SpO2:  [95 %-98 %] 95 %  BP: (108-156)/(58-69) 156/64    Gen: alert, well appearing, and in no distress,      Abdomen:  Soft appropriately tender to palpation.  It minimal drainage on OR dressing.    Recent Labs   Lab 09/06/19  1153   WBC 7.18   HGB 14.5   HCT 43.7         K 5.2*      BUN 13   *        Assessment:  Postop day 1 status post right hemicolectomy    Plan:  Clear liquid diet  DC Cueva  Ambulate    Bryan Cassidy MD

## 2019-09-13 NOTE — PT/OT/SLP EVAL
Physical Therapy Evaluation    Patient Name:  Ny Romeo   MRN:  [id number]    Recommendations:     Discharge Recommendations:  home health PT   Discharge Equipment Recommendations: (ongoing, most likely none)   Barriers to discharge: Inaccessible home    Assessment:     Ny Romeo is a 64 y.o. female admitted with a medical diagnosis of Malignant neoplasm of appendix.  She presents with the following impairments/functional limitations:  weakness, impaired self care skills, impaired balance, decreased coordination, decreased safety awareness, impaired skin, impaired endurance, impaired functional mobilty, pain, gait instability .    Rehab Prognosis: Good; patient would benefit from acute skilled PT services to address these deficits and reach maximum level of function.    Recent Surgery: Procedure(s) (LRB):  COLECTOMY, RIGHT, LAPAROSCOPIC (Right)  CYSTOSCOPY, WITH URETERAL STENT INSERTION (Bilateral) 1 Day Post-Op    Plan:     During this hospitalization, patient to be seen daily to address the identified rehab impairments via gait training, therapeutic activities, therapeutic exercises and progress toward the following goals:    · Plan of Care Expires:  09/20/19    Subjective     Chief Complaint: pain  Patient/Family Comments/goals: to go home this weekend  Pain/Comfort:  · Pain Rating 1: 4/10  · Location 1: abdomen  · Pain Addressed 1: Pre-medicate for activity, Reposition, Distraction, Cessation of Activity, Nurse notified  · Pain Rating Post-Intervention 1: 5/10    Patients cultural, spiritual, Hinduism conflicts given the current situation: no    Living Environment:  Pt lives with her spouse in a townhouse with stairs(B HR's)to 2nd floor  Prior to admission, patients level of function was Independent.  Equipment used at home: none.  DME owned (not currently used): none.  Upon discharge, patient will have assistance from spouse.    Objective:     Communicated with nsg prior to session.  Patient  found HOB elevated with peripheral IV  upon PT entry to room.    General Precautions: Standard, fall   Orthopedic Precautions:N/A   Braces: N/A     Exams:  · Cognitive Exam:  Patient is oriented to Person, Place, Time and Situation  · Gross Motor Coordination:  mildly impaired 2/2 pain, deconditioning, gen weakness  · Postural Exam:  Patient presented with the following abnormalities:    · -       Rounded shoulders  · -       Forward head  · Sensation:    · -       Intact  light/touch B LE's  · Skin Integrity/Edema:      · -       Skin integrity: Visible skin intact  · RLE ROM: WFL  · RLE Strength: WFL  · LLE ROM: WFL  · LLE Strength: WFL    Functional Mobility:  · Bed Mobility:     · Scooting: stand by assistance  · Supine to Sit: stand by assistance  · Sit to Supine: stand by assistance  · Transfers:     · Sit to Stand:  contact guard assistance and with Vc for hand placmenet/safety with no AD  · Gait: 200' with CGA  · Balance: Fair+ sit, fair stand      Therapeutic Activities and Exercises:   Prior to evLEENA green ther ex handout dispensed and reviewed.  Pt verbalized/demonstrated understanding of AP's, TKE's, GS to be performed while in bed.      AM-PAC 6 CLICK MOBILITY  Total Score:17     Patient left HOB elevated with all lines intact, call button in reach, nsg notified and spouse present.    GOALS:   Multidisciplinary Problems     Physical Therapy Goals        Problem: Physical Therapy Goal    Goal Priority Disciplines Outcome Goal Variances Interventions   Physical Therapy Goal     PT, PT/OT Ongoing (interventions implemented as appropriate)     Description:  Goals to be met by: 2019     Patient will increase functional independence with mobility by performin. Supine to sit with Modified [place]  2. Sit to stand transfer with Modified [place]  3. Gait  x 200 feet with Modified [place] with or without AD.   4. Ascend/descend 10 stair with HR and Minimal Assistance    5. Lower  extremity exercise program x10 reps per handout, with supervision                      History:     Past Medical History:   Diagnosis Date    Acid reflux     Cancer     adenocarcinoma of appendix       Past Surgical History:   Procedure Laterality Date    APPENDECTOMY, LAPAROSCOPIC N/A 2019    Performed by Pio Castillo III, MD at [facility] OR     SECTION      TONSILLECTOMY         Time Tracking:     PT Received On: 19  PT Start Time: 1122     PT Stop Time: 1130  PT Total Time (min): 8 min   PM   12:25-12:35  Eval 10m      Billable Minutes: Evaluation 10 and Therapeutic Exercise 8      Larissa Bishop, PT  2019

## 2019-09-14 PROCEDURE — 97530 THERAPEUTIC ACTIVITIES: CPT

## 2019-09-14 PROCEDURE — 94799 UNLISTED PULMONARY SVC/PX: CPT

## 2019-09-14 PROCEDURE — 25000003 PHARM REV CODE 250: Performed by: SURGERY

## 2019-09-14 PROCEDURE — 97110 THERAPEUTIC EXERCISES: CPT

## 2019-09-14 PROCEDURE — 63600175 PHARM REV CODE 636 W HCPCS: Performed by: SURGERY

## 2019-09-14 PROCEDURE — 11000001 HC ACUTE MED/SURG PRIVATE ROOM

## 2019-09-14 PROCEDURE — 94761 N-INVAS EAR/PLS OXIMETRY MLT: CPT

## 2019-09-14 PROCEDURE — 99900035 HC TECH TIME PER 15 MIN (STAT)

## 2019-09-14 RX ORDER — ONDANSETRON 2 MG/ML
4 INJECTION INTRAMUSCULAR; INTRAVENOUS EVERY 8 HOURS PRN
Status: DISCONTINUED | OUTPATIENT
Start: 2019-09-14 | End: 2019-09-18 | Stop reason: HOSPADM

## 2019-09-14 RX ADMIN — DEXTROSE MONOHYDRATE, SODIUM CHLORIDE, AND POTASSIUM CHLORIDE: 50; 4.5; 1.49 INJECTION, SOLUTION INTRAVENOUS at 12:09

## 2019-09-14 RX ADMIN — HYDROCODONE BITARTRATE AND ACETAMINOPHEN 1 TABLET: 10; 325 TABLET ORAL at 12:09

## 2019-09-14 RX ADMIN — FAMOTIDINE 20 MG: 20 TABLET ORAL at 08:09

## 2019-09-14 RX ADMIN — ENOXAPARIN SODIUM 40 MG: 100 INJECTION SUBCUTANEOUS at 05:09

## 2019-09-14 RX ADMIN — ONDANSETRON 4 MG: 2 INJECTION INTRAMUSCULAR; INTRAVENOUS at 05:09

## 2019-09-14 NOTE — PLAN OF CARE
Problem: Adult Inpatient Plan of Care  Goal: Plan of Care Review     09/14/19 9063   Plan of Care Review   Plan of Care Reviewed With patient   Pt remains free from falls and pressure injuries,able to make needs known, jayesh meds well,pain controlled by prn pain meds,abdominal incision dressing dry intact old drainage noted, no redness,swelling noted,iv hydration with potassium in progress,appitite poor,continue monitoring.

## 2019-09-14 NOTE — PT/OT/SLP PROGRESS
Physical Therapy  Treatment    Ny Romeo   MRN: [id number]   Admitting Diagnosis: Malignant neoplasm of appendix    PT Received On: 09/14/19  PT Start Time: 1255     PT Stop Time: 1320    PT Total Time (min): 25 min       Billable Minutes:  Therapeutic Activity 15 and Therapeutic Exercise 10    Treatment Type: Treatment  PT/PTA: PT     PTA Visit Number: 0       General Precautions: Standard, fall  Orthopedic Precautions: N/A   Braces: N/A    Spiritual, Cultural Beliefs, Sikhism Practices, Values that Affect Care: no    Subjective:  Communicated with nurse Letitia prior to session.  Pt reports she is feeling better now that she has some pain medication, other than that just fatigued. Would like to use the restroom    Pain/Comfort  Pain Rating 1: 5/10  Location 1: abdomen  Pain Addressed 1: Pre-medicate for activity, Nurse notified, Distraction, Reposition  Pain Rating Post-Intervention 1: 5/10    Objective:   Patient found with: peripheral IV    Functional Mobility:  Bed Mobility:    Pt instructed on log roll technique. Requires visual/verbal cuing for sequencing and hand placement. Pt can roll, scoot and perform log roll technique w/ SBA    Transfers:   Pt performs sit<>stand and stand<>toilet transfer w/ supervision, no AD. Pt benefits from VCs for hand placement.    Gait: Pt amb 200 ft w/ supervision. Pt utilizes hand rail when available, but reports it is not d/t feeling unsteady, able to amb w/out UE assist     Balance:   Static Sit: GOOD: Takes MODERATE challenges from all directions  Dynamic Sit: GOOD-: Incosistently Maintains balance through MODERATE excursions of active trunk movement,     Static Stand: GOOD-: Takes MODERATE challenges from all directions inconsistently  Dynamic stand: GOOD-: Needs SUPERVISION only during gait and able to self right with moderate LOB inconsistently     Therapeutic Activities and Exercises:  Pt instructed through sidelying clamshells 2x10, pelvic tilts 2x10 and  bridges 2x10. Pt tolerates all exercises well, pt instructed to perform pelvic tilts to tolerance and not push through any pain. New exercises added to pt's handout, demonstrates good understanding of frequency, and exercise form    AM-PAC 6 CLICK MOBILITY  How much help from another person does this patient currently need?   1 = Unable, Total/Dependent Assistance  2 = A lot, Maximum/Moderate Assistance  3 = A little, Minimum/Contact Guard/Supervision  4 = None, Modified [place]/Independent    Turning over in bed (including adjusting bedclothes, sheets and blankets)?: 3  Sitting down on and standing up from a chair with arms (e.g., wheelchair, bedside commode, etc.): 4  Moving from lying on back to sitting on the side of the bed?: 4  Moving to and from a bed to a chair (including a wheelchair)?: 4  Need to walk in hospital room?: 3  Climbing 3-5 steps with a railing?: 2  Basic Mobility Total Score: 20    AM-PAC Raw Score CMS G-Code Modifier Level of Impairment Assistance   6 % Total / Unable   7 - 9 CM 80 - 100% Maximal Assist   10 - 14 CL 60 - 80% Moderate Assist   15 - 19 CK 40 - 60% Moderate Assist   20 - 22 CJ 20 - 40% Minimal Assist   23 CI 1-20% SBA / CGA   24 CH 0% Independent/ Mod I     Patient left HOB elevated with nsg notified and  present.    Assessment:  Ny Romeo is a 64 y.o. female with a medical diagnosis of Malignant neoplasm of appendix and presents with some abdominal pain. Pt benefits from education on log roll technique, reports less pain with supine to sit transfer. Pt tolerates transfers and gait training well. Pt educated on fall prevention/ safety while navigating stairs. Demonstrates good understanding of what exercises to perform while in bed.    Rehab identified problem list/impairments: Rehab identified problem list/impairments: weakness, impaired endurance, impaired self care skills, gait instability, impaired balance, pain    Rehab potential is  excellent.    Activity tolerance: Excellent    Discharge recommendations: Discharge Facility/Level of Care Needs: home health PT     Barriers to discharge:  n/a    Equipment recommendations: Equipment Needed After Discharge: none     GOALS:   Multidisciplinary Problems     Physical Therapy Goals        Problem: Physical Therapy Goal    Goal Priority Disciplines Outcome Goal Variances Interventions   Physical Therapy Goal     PT, PT/OT Ongoing (interventions implemented as appropriate)     Description:  Goals to be met by: 2019     Patient will increase functional independence with mobility by performin. Supine to sit with Modified [place]  2. Sit to stand transfer with Modified [place]  3. Gait  x 200 feet with Modified [place] with or without AD.   4. Ascend/descend 10 stair with HR and Minimal Assistance    5. Lower extremity exercise program x10 reps per handout, with supervision                      PLAN:    Patient to be seen 5 x/week  to address the above listed problems via gait training, therapeutic activities, therapeutic exercises  Plan of Care expires: 19  Plan of Care reviewed with: patient, spouse         Rosendo Majo, PT  2019

## 2019-09-14 NOTE — PROGRESS NOTES
Surgery/postoperative day 2    Patient resting well no complaints positive loose stools taking some p.o. and mild nausea no vomiting    Heart rate 92 blood pressure 164/77    Abdomen soft nontender    Assessment status post right colon for appendiceal tumor    Planned advanced diet.  Will back off on IV fluids.  Continue to observe

## 2019-09-14 NOTE — PROGRESS NOTES
Nursing Notes  19:02 Report received from morning nurse.  Patient resting in bed in  low and locked position.        0630 Patient blood pressure taken manually at 158/86.      06:41 Dr. Connolly called about patient's hypertension.  Physician stated he will take care of it when he gets to the hospital    07:27 Report given to morning nurse.  Patient resting in bed in low and locked position.        Huddle Comments

## 2019-09-14 NOTE — PLAN OF CARE
Problem: Adult Inpatient Plan of Care  Goal: Plan of Care Review  Outcome: Ongoing (interventions implemented as appropriate)     09/14/19 0643   Plan of Care Review   Plan of Care Reviewed With patient   Progress improving     Patient continues on IV fluids.  Patient remained free from fall throughout shift.  No acute distress noted.  Patient is alert and oriented x 3.

## 2019-09-14 NOTE — PLAN OF CARE
Problem: Adult Inpatient Plan of Care  Goal: Plan of Care Review  Outcome: Ongoing (interventions implemented as appropriate)     09/14/19 6739   Plan of Care Review   Plan of Care Reviewed With patient     Patient dressing remain intact with scant amount of drainage.  Analgesics on care plan. Patient remained free from injuries throughout shift.  No acute injuries noted.

## 2019-09-15 PROCEDURE — 11000001 HC ACUTE MED/SURG PRIVATE ROOM

## 2019-09-15 PROCEDURE — 63600175 PHARM REV CODE 636 W HCPCS: Performed by: SURGERY

## 2019-09-15 PROCEDURE — 94761 N-INVAS EAR/PLS OXIMETRY MLT: CPT

## 2019-09-15 PROCEDURE — 94799 UNLISTED PULMONARY SVC/PX: CPT

## 2019-09-15 PROCEDURE — 25000003 PHARM REV CODE 250: Performed by: SURGERY

## 2019-09-15 RX ADMIN — HYDROCODONE BITARTRATE AND ACETAMINOPHEN 1 TABLET: 10; 325 TABLET ORAL at 02:09

## 2019-09-15 RX ADMIN — DEXTROSE MONOHYDRATE, SODIUM CHLORIDE, AND POTASSIUM CHLORIDE: 50; 4.5; 1.49 INJECTION, SOLUTION INTRAVENOUS at 08:09

## 2019-09-15 RX ADMIN — FAMOTIDINE 20 MG: 20 TABLET ORAL at 09:09

## 2019-09-15 RX ADMIN — FAMOTIDINE 20 MG: 20 TABLET ORAL at 08:09

## 2019-09-15 RX ADMIN — ENOXAPARIN SODIUM 40 MG: 100 INJECTION SUBCUTANEOUS at 05:09

## 2019-09-15 NOTE — PLAN OF CARE
Problem: Adult Inpatient Plan of Care  Goal: Plan of Care Review  Outcome: Ongoing (interventions implemented as appropriate)  Pt. Resting in bed, reports 6 loose, watery BMs this shift, small amt cloudy urine noted this morning, pt. Given prn pain mediciation this morning for c/o back pain, reports pain relived w/ PRN medication; resting quietly in bed, no s/s of discomfort noted, safety measures in place, will cont. To monitor.

## 2019-09-15 NOTE — PLAN OF CARE
Problem: Adult Inpatient Plan of Care  Goal: Plan of Care Review     09/15/19 1525   Plan of Care Review   Plan of Care Reviewed With patient;spouse   Pt remains free from falls and pressure injuries,able to make needs known,jayesh meds well,surgical incisions staples intact no redness,swelling or drainage noted,denies pain at this time,ambulates to and from bathroom with standby assist, appetite fair no c/o nausea at this time,continent of b/b,continue monitoring.

## 2019-09-15 NOTE — PROGRESS NOTES
Surgery/postoperative day 3    Patient reports some nausea yesterday and decrease in her p.o. intake positive flatus positive loose stools.  Denies abdominal bloating.  Reports that nausea has resolved    Vital signs stable afebrile    Non tachycardic    Abdomen soft nondistended    Assessment patient has some mild nausea yesterday with some diarrhea this has resolved    Plan will continue clear liquids today and see how she does.  Not unexpected.  Do not think anything is going on

## 2019-09-16 LAB
ANION GAP SERPL CALC-SCNC: 8 MMOL/L (ref 8–16)
BASOPHILS # BLD AUTO: 0.02 K/UL (ref 0–0.2)
BASOPHILS NFR BLD: 0.3 % (ref 0–1.9)
BUN SERPL-MCNC: 6 MG/DL (ref 8–23)
CALCIUM SERPL-MCNC: 9 MG/DL (ref 8.7–10.5)
CHLORIDE SERPL-SCNC: 103 MMOL/L (ref 95–110)
CO2 SERPL-SCNC: 26 MMOL/L (ref 23–29)
CREAT SERPL-MCNC: 0.8 MG/DL (ref 0.5–1.4)
DIFFERENTIAL METHOD: NORMAL
EOSINOPHIL # BLD AUTO: 0.4 K/UL (ref 0–0.5)
EOSINOPHIL NFR BLD: 5.4 % (ref 0–8)
ERYTHROCYTE [DISTWIDTH] IN BLOOD BY AUTOMATED COUNT: 13.8 % (ref 11.5–14.5)
EST. GFR  (AFRICAN AMERICAN): >60 ML/MIN/1.73 M^2
EST. GFR  (NON AFRICAN AMERICAN): >60 ML/MIN/1.73 M^2
GLUCOSE SERPL-MCNC: 138 MG/DL (ref 70–110)
HCT VFR BLD AUTO: 41.4 % (ref 37–48.5)
HGB BLD-MCNC: 13.4 G/DL (ref 12–16)
LYMPHOCYTES # BLD AUTO: 2.1 K/UL (ref 1–4.8)
LYMPHOCYTES NFR BLD: 28.9 % (ref 18–48)
MCH RBC QN AUTO: 29.5 PG (ref 27–31)
MCHC RBC AUTO-ENTMCNC: 32.4 G/DL (ref 32–36)
MCV RBC AUTO: 91 FL (ref 82–98)
MONOCYTES # BLD AUTO: 0.7 K/UL (ref 0.3–1)
MONOCYTES NFR BLD: 9.3 % (ref 4–15)
NEUTROPHILS # BLD AUTO: 4 K/UL (ref 1.8–7.7)
NEUTROPHILS NFR BLD: 56.5 % (ref 38–73)
PLATELET # BLD AUTO: 200 K/UL (ref 150–350)
PMV BLD AUTO: 10.4 FL (ref 9.2–12.9)
POTASSIUM SERPL-SCNC: 4.4 MMOL/L (ref 3.5–5.1)
RBC # BLD AUTO: 4.55 M/UL (ref 4–5.4)
SODIUM SERPL-SCNC: 137 MMOL/L (ref 136–145)
WBC # BLD AUTO: 7.17 K/UL (ref 3.9–12.7)

## 2019-09-16 PROCEDURE — 97110 THERAPEUTIC EXERCISES: CPT

## 2019-09-16 PROCEDURE — 97530 THERAPEUTIC ACTIVITIES: CPT

## 2019-09-16 PROCEDURE — 25000003 PHARM REV CODE 250: Performed by: SURGERY

## 2019-09-16 PROCEDURE — 63600175 PHARM REV CODE 636 W HCPCS: Performed by: SURGERY

## 2019-09-16 PROCEDURE — 80048 BASIC METABOLIC PNL TOTAL CA: CPT

## 2019-09-16 PROCEDURE — 85025 COMPLETE CBC W/AUTO DIFF WBC: CPT

## 2019-09-16 PROCEDURE — 11000001 HC ACUTE MED/SURG PRIVATE ROOM

## 2019-09-16 PROCEDURE — 36415 COLL VENOUS BLD VENIPUNCTURE: CPT

## 2019-09-16 PROCEDURE — 97116 GAIT TRAINING THERAPY: CPT

## 2019-09-16 RX ORDER — HYDRALAZINE HYDROCHLORIDE 20 MG/ML
10 INJECTION INTRAMUSCULAR; INTRAVENOUS EVERY 6 HOURS PRN
Status: DISCONTINUED | OUTPATIENT
Start: 2019-09-16 | End: 2019-09-18 | Stop reason: HOSPADM

## 2019-09-16 RX ADMIN — ENOXAPARIN SODIUM 40 MG: 100 INJECTION SUBCUTANEOUS at 06:09

## 2019-09-16 RX ADMIN — DEXTROSE MONOHYDRATE, SODIUM CHLORIDE, AND POTASSIUM CHLORIDE: 50; 4.5; 1.49 INJECTION, SOLUTION INTRAVENOUS at 01:09

## 2019-09-16 RX ADMIN — FAMOTIDINE 20 MG: 20 TABLET ORAL at 08:09

## 2019-09-16 RX ADMIN — FAMOTIDINE 20 MG: 20 TABLET ORAL at 09:09

## 2019-09-16 RX ADMIN — HYDRALAZINE HYDROCHLORIDE 10 MG: 20 INJECTION INTRAMUSCULAR; INTRAVENOUS at 06:09

## 2019-09-16 NOTE — PLAN OF CARE
Problem: Adult Inpatient Plan of Care  Goal: Plan of Care Review  Outcome: Ongoing (interventions implemented as appropriate)  Patient remains free from injury and falls. Denies pain. Midline incision open to air with staples intact. Patient denies pain throughout shift. IVF infusing as ordered. Tolerating clear liquid diet. Ambulates independently. Plan of care continued.

## 2019-09-16 NOTE — PROGRESS NOTES
"SW met with patient to inquire if she would home health/PT at discharge. Patient declined home health services stated "I don't feel that I will need it".  "

## 2019-09-16 NOTE — PT/OT/SLP PROGRESS
Physical Therapy Treatment    Patient Name:  Ny Romeo   MRN:  [id number]    Recommendations:     Discharge Recommendations:  home health PT   Discharge Equipment Recommendations: none   Barriers to discharge: Inaccessible home    Assessment:     Ny Romeo is a 64 y.o. female admitted with a medical diagnosis of Malignant neoplasm of appendix.  She presents with the following impairments/functional limitations:  weakness, impaired endurance, impaired balance, gait instability, decreased lower extremity function, pain, decreased safety awareness .    Rehab Prognosis: Good; patient would benefit from acute skilled PT services to address these deficits and reach maximum level of function.    Recent Surgery: Procedure(s) (LRB):  COLECTOMY, RIGHT, LAPAROSCOPIC (Right)  CYSTOSCOPY, WITH URETERAL STENT INSERTION (Bilateral) 4 Days Post-Op    Plan:     During this hospitalization, patient to be seen 5 x/week to address the identified rehab impairments via gait training, therapeutic activities, therapeutic exercises and progress toward the following goals:    · Plan of Care Expires:  09/20/19    Subjective     Chief Complaint: none   Patient/Family Comments/goals: pt agreeable to treatment   Pain/Comfort:  · Pain Rating 1: 0/10  · Pain Rating Post-Intervention 1: 0/10      Objective:     Communicated with nurse Diaz prior to session.  Patient found HOB elevated with bed alarm upon PT entry to room.     General Precautions: Standard, fall   Orthopedic Precautions:N/A   Braces: N/A     Functional Mobility:  · Bed Mobility:  Pt performed log rolling for bed mobility   · Rolling Left:  supervision  · Scooting: supervision  · Supine to Sit: supervision  · Sit to Supine: supervision  · Transfers:     · Sit to Stand:  supervision with no AD  · Gait:  Pt ambulated ~ 500 ft with no AD, S. Noted with decreased param, decreased step length. Encouraged pt to ambulate in the hallway with nursing staff or family  throughout the day. Pt verbalize understanding.  · Balance: Good -      AM-PAC 6 CLICK MOBILITY  Turning over in bed (including adjusting bedclothes, sheets and blankets)?: 4  Sitting down on and standing up from a chair with arms (e.g., wheelchair, bedside commode, etc.): 4  Moving from lying on back to sitting on the side of the bed?: 4  Moving to and from a bed to a chair (including a wheelchair)?: 4  Need to walk in hospital room?: 3  Climbing 3-5 steps with a railing?: 3  Basic Mobility Total Score: 22       Therapeutic Activities and Exercises:   pt performed supine BLE x 20 : AP, GS and standing BLE step up/down on portable step with CGA , R handrail x 14 reps . V/T cues for proper technique and sequence.   Encouraged pt to perform BLE ex's per handout throughout the day. Pt verbalize understanding.      Patient left with bed in chair position with all lines intact, call button in reach, nurse notified and spouse present..    GOALS:   Multidisciplinary Problems     Physical Therapy Goals        Problem: Physical Therapy Goal    Goal Priority Disciplines Outcome Goal Variances Interventions   Physical Therapy Goal     PT, PT/OT Ongoing (interventions implemented as appropriate)     Description:  Goals to be met by: 2019     Patient will increase functional independence with mobility by performin. Supine to sit with Modified [place]  2. Sit to stand transfer with Modified [place]  3. Gait  x 200 feet with Modified [place] with or without AD.   4. Ascend/descend 10 stair with HR and Minimal Assistance    5. Lower extremity exercise program x10 reps per handout, with supervision                      Time Tracking:     PT Received On: 19  PT Start Time: 1520     PT Stop Time: 1545  PT Total Time (min): 25 min     Billable Minutes: Gait Training 13 and Therapeutic Exercise 12    Treatment Type: Treatment  PT/PTA: PTA     PTA Visit Number: 1     Maggie Coughlin, PTA  2019

## 2019-09-16 NOTE — PLAN OF CARE
Patient from home with spouse and will return when medically stable. PT/OT evaluated patient and recommended home health/PT.          09/16/19 1120   Discharge Reassessment   Assessment Type Discharge Planning Reassessment   Provided patient/caregiver education on the expected discharge date and the discharge plan No   Do you have any problems affording any of your prescribed medications? No   Discharge Plan A Home with family;Home Health   Discharge Plan B Home with family  (PCP F/U)   DME Needed Upon Discharge  none   Patient choice form signed by patient/caregiver N/A   Anticipated Discharge Disposition Home-Health   Can the patient answer the patient profile reliably? Yes, cognitively intact   How does the patient rate their overall health at the present time? Good   Describe the patient's ability to walk at the present time. No restrictions   How often would a person be available to care for the patient? Whenever needed   Post-Acute Status   Post-Acute Authorization Home Health/Hospice   Home Health/Hospice Status Awaiting Orders for HH

## 2019-09-16 NOTE — PROGRESS NOTES
"gen surg pod 4  jayesh some liquids, passinhg flatus, had loose brown bm yest, still w early satiety  Blood pressure (!) 159/71, pulse 82, temperature 98.2 °F (36.8 °C), temperature source Oral, resp. rate 18, height 5' 6.5" (1.689 m), weight 86.4 kg (190 lb 7.6 oz), SpO2 96 %, not currently breastfeeding.  abd soft, bs present,nd, incs healing well, no hernia, appropriate inc tenderness  A/p s/p lap R colecotmy- not ready to adv diet, oob, IS, check abd film and labs this am, path pending  "

## 2019-09-16 NOTE — PLAN OF CARE
Problem: Physical Therapy Goal  Goal: Physical Therapy Goal  Goals to be met by: 2019     Patient will increase functional independence with mobility by performin. Supine to sit with Modified [place]  2. Sit to stand transfer with Modified [place]  3. Gait  x 200 feet with Modified [place] with or without AD.   4. Ascend/descend 10 stair with HR and Minimal Assistance    5. Lower extremity exercise program x10 reps per handout, with supervision     Outcome: Ongoing (interventions implemented as appropriate)  Pt ambulated ~ 500 ft with no AD, S. Encouraged pt to ambulate in the hallway with nursing staff or family throughout the day. Pt verbalize understanding.

## 2019-09-17 PROBLEM — C18.9 COLON CANCER: Status: ACTIVE | Noted: 2019-09-17

## 2019-09-17 PROCEDURE — 63600175 PHARM REV CODE 636 W HCPCS: Performed by: SURGERY

## 2019-09-17 PROCEDURE — 99223 PR INITIAL HOSPITAL CARE,LEVL III: ICD-10-PCS | Mod: ,,, | Performed by: INTERNAL MEDICINE

## 2019-09-17 PROCEDURE — 99223 1ST HOSP IP/OBS HIGH 75: CPT | Mod: ,,, | Performed by: INTERNAL MEDICINE

## 2019-09-17 PROCEDURE — 97530 THERAPEUTIC ACTIVITIES: CPT

## 2019-09-17 PROCEDURE — 25000003 PHARM REV CODE 250: Performed by: SURGERY

## 2019-09-17 PROCEDURE — 11000001 HC ACUTE MED/SURG PRIVATE ROOM

## 2019-09-17 RX ADMIN — DEXTROSE MONOHYDRATE, SODIUM CHLORIDE, AND POTASSIUM CHLORIDE: 50; 4.5; 1.49 INJECTION, SOLUTION INTRAVENOUS at 12:09

## 2019-09-17 RX ADMIN — FAMOTIDINE 20 MG: 20 TABLET ORAL at 09:09

## 2019-09-17 NOTE — HPI
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   She was admitted to [facility] on 5/20/2019  with acute appendicitis complicated by perforation s/p laparoscopic appendectomy 5/20/19. 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 Proximal Margins uninvolved.  No lymphovascular invasion. Five lymph nodes negative for neoplasm. Pathologic Stage Classification (pTNM, AJCC 8th Edition):Primary Tumor (pT): pT1 pN0 Hospital course complicated by post-op subjective fevers with f/u CT showing a 1.5 x 2.1 x 1.6-cm fluid collection at the appendectomy site that measures simple fluid density and additional 2.9 x 6.0 x 3.1-cm recto-vaginal fluid collection which also measures simple fluid density which was presumed to be an abscess that was drained percutaneously, but it appeared to be a noninfected. She underwent her first screening colonoscopy 8/2019 and was diagnosed with a cecal mass. Colonoscopy showed a mass in the cecum at the appendiceal orifice with biopsy revealing villous adenoma atypia.  She underwent lap R colectomy on 9/12/2019 pathologic stage  [id number] .  Consulted for colon cancer and history of appendiceal cancer.

## 2019-09-17 NOTE — ASSESSMENT & PLAN NOTE
Patient is s/p lap R colectomy with pathologic staging  [id number]   9/12/2019   Pathology shows adenocarcinoma, G1  23 lymph nodes removed negative for neoplasm  Pre op CEA 1.3ng/ml

## 2019-09-17 NOTE — CONSULTS
[facility]  Hematology/Oncology  Consult Note    Patient Name: Ny Romeo  MRN: [id number]  Admission Date: 9/12/2019  Hospital Length of Stay: 5 days  Code Status: Full Code   Attending Provider: Pio Castillo III,*  Consulting Provider: Gsiselle Madera MD  Primary Care Physician: Primary Doctor No  Principal Problem:Malignant neoplasm of appendix    Inpatient consult to Hematology/Oncology - [facility]  Consult performed by: Gisselle Madera MD  Consult ordered by: Pio Castillo III, MD        Subjective:   REASON FOR CONSULTATION:Node negative appendiceal and colon cancer   HPI:  Pt is a 65 y/o female with 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   She was admitted to [facility] on 5/20/2019  with acute appendicitis complicated by perforation s/p laparoscopic appendectomy 5/20/19. 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 Proximal Margins uninvolved.  No lymphovascular invasion. Five lymph nodes negative for neoplasm. Pathologic Stage Classification (pTNM, AJCC 8th Edition):Primary Tumor (pT): pT1 pN0 Hospital course complicated by post-op subjective fevers with f/u CT showing a 1.5 x 2.1 x 1.6-cm fluid collection at the appendectomy site that measures simple fluid density and additional 2.9 x 6.0 x 3.1-cm recto-vaginal fluid collection which also measures simple fluid density which was presumed to be an abscess that was drained percutaneously, but it appeared to be a noninfected. She underwent her first screening colonoscopy 8/2019 and was diagnosed with a cecal mass. Colonoscopy showed a mass in the cecum at the appendiceal orifice with biopsy revealing villous adenoma atypia.  She underwent lap R colectomy on 9/12/2019 pathologic stage  [id number] .  Consulted for colon cancer and history of appendiceal cancer.     Oncology Treatment Plan:   [No treatment  plan]    Medications:  Continuous Infusions:   dextrose 5 % and 0.45 % NaCl with KCl 20 mEq 50 mL/hr at 19 0600     Scheduled Meds:   enoxaparin  40 mg Subcutaneous Daily    famotidine  20 mg Oral BID     PRN Meds:hydrALAZINE, HYDROcodone-acetaminophen, ondansetron     Review of patient's allergies indicates:   Allergen Reactions    Penicillins         Past Medical History:   Diagnosis Date    Acid reflux     Cancer     adenocarcinoma of appendix     Past Surgical History:   Procedure Laterality Date    APPENDECTOMY, LAPAROSCOPIC N/A 2019    Performed by Pio Castillo III, MD at [facility] OR     SECTION      COLECTOMY, RIGHT, LAPAROSCOPIC Right 2019    [facility], [facility] at [facility] OR    CYSTOSCOPY, WITH URETERAL STENT INSERTION Bilateral 2019    Performed by MAEVE North MD at [facility] OR    TONSILLECTOMY       Family History     None        Tobacco Use    Smoking status: Former Smoker     Types: Cigarettes     Last attempt to quit: 2017     Years since quittin.7    Smokeless tobacco: Never Used   Substance and Sexual Activity    Alcohol use: Yes     Alcohol/week: 0.6 oz     Types: 1 Cans of beer per week     Comment: socially    Drug use: No    Sexual activity: Yes     Partners: Male       Review of Systems   Constitutional: Negative for appetite change, fatigue, fever and unexpected weight change.   HENT: Negative for mouth sores.    Eyes: Negative for visual disturbance.   Respiratory: Negative for cough and shortness of breath.    Cardiovascular: Negative for chest pain.   Gastrointestinal: Negative for abdominal pain and diarrhea.   Genitourinary: Negative for frequency.   Musculoskeletal: Negative for back pain.   Skin: Negative for rash.   Neurological: Negative for headaches.   Hematological: Negative for adenopathy.   Psychiatric/Behavioral: The patient is not nervous/anxious.      Objective:     Vital Signs (Most Recent):  Temp: 98 °F  (36.7 °C) (09/17/19 1159)  Pulse: 89 (09/17/19 1159)  Resp: 16 (09/17/19 1159)  BP: (!) 161/75 (09/17/19 1159)  SpO2: 98 % (09/17/19 1159) Vital Signs (24h Range):  Temp:  [98 °F (36.7 °C)-98.3 °F (36.8 °C)] 98 °F (36.7 °C)  Pulse:  [77-95] 89  Resp:  [16-18] 16  SpO2:  [93 %-98 %] 98 %  BP: (141-167)/(65-75) 161/75     Weight: 86.4 kg (190 lb 7.6 oz)  Body mass index is 30.28 kg/m².  Body surface area is 2.01 meters squared.      Intake/Output Summary (Last 24 hours) at 9/17/2019 1350  Last data filed at 9/17/2019 0830  Gross per 24 hour   Intake 1560 ml   Output --   Net 1560 ml       Physical Exam   Constitutional: She is oriented to person, place, and time. She appears well-developed and well-nourished.   HENT:   Head: Normocephalic.   Mouth/Throat: Oropharynx is clear and moist. No oropharyngeal exudate.   Eyes: Pupils are equal, round, and reactive to light. Conjunctivae and lids are normal. No scleral icterus.   Neck: Normal range of motion. Neck supple. No thyromegaly present.   Cardiovascular: Normal rate, regular rhythm and normal heart sounds.   No murmur heard.  Pulmonary/Chest: Breath sounds normal. She has no wheezes. She has no rales.   Abdominal: Soft. Bowel sounds are normal. She exhibits no distension and no mass. There is no hepatosplenomegaly. There is no tenderness. There is no rebound and no guarding.   Musculoskeletal: Normal range of motion. She exhibits no edema or tenderness.   Neurological: She is alert and oriented to person, place, and time. No cranial nerve deficit. Coordination normal.   Skin: Skin is warm and dry. No ecchymosis, no petechiae and no rash noted. No erythema.   Psychiatric: She has a normal mood and affect.       Significant Labs:   All pertinent labs within the past 24 hours have been reviewed    Diagnostic Results:  I have reviewed and interpreted all pertinent imaging results/findings within the past 24 hours    Assessment/Plan:     * Malignant neoplasm of  appendix  Pt  diagnosed adenocarcinoma of appendix  5/2019 presenting in setting of acute appendicitis complicated by perforation status post laparoscopic appendectomy 5/20/2019 .Pathology showed Pathology showed appendiceal adenocarcinoma, G1 well -differentiated, 17mm. Proximal Margins uninvolved.  No lymphovascular invasion. Five lymph nodes negative for neoplasm. Pathologic Stage Classification (pTNM, AJCC 8th Edition):Primary Tumor (pT): pT1 pN0  Stage 1 [id number]. CT imaging a/p did not reveal any evidence of distant disease  Surgical intervention  with formal right hemicolectomy discussed at that time due to risk of dionisio metastases.  Discussed issue of adjuvant chemotherapy and that adjuvant chemotherapy entirely clear in this setting and often based on recommendations for adenocarcinomas of colon.   It was determined no strong indication for  Stage 1 disease , even in setting of perforation with stage 1 disease as these are often treated like adenocarcinomas of colon.     Colon cancer  Patient is s/p lap R colectomy with pathologic staging  [id number]   9/12/2019   Pathology shows adenocarcinoma, G1  23 lymph nodes removed negative for neoplasm  Pre op CEA 1.3ng/ml  Discussed pathology findings in detail w/pt   Pt has Stage 1 [id number]  Adenocarcinoma of colon  Consider CT imaging as outpt - staging w/u   No indication for adjuvant chemo in stage 1       Outpatient follow up scheduled Oct 2019    Thank you for your consult.    Gisselle Madera MD  Hematology/Oncology  [facility] [place]

## 2019-09-17 NOTE — ASSESSMENT & PLAN NOTE
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 Proximal Margins uninvolved.  No lymphovascular invasion. Five lymph nodes negative for neoplasm. Pathologic Stage Classification (pTNM, AJCC 8th Edition):Primary Tumor (pT): pT1 pN0  Stage 1 [id number]. CT imaging a/p did not reveal any evidence of distant disease  Surgical intervention  with formal right hemicolectomy discussed at that time due to risk of dionisio metastases.  Discussed issue of adjuvant chemotherapy and that adjuvant chemotherapy entirely clear in this setting and often based on recommendations for adenocarcinomas of colon.   It was determined no strong indication for  Stage 1 disease , even in setting of perforation with stage 1 disease as these are often treated like adenocarcinomas of colon.

## 2019-09-17 NOTE — PROGRESS NOTES
"gen surg pod 5  Feeling better, appetite improving, wants soft, diet, having bms, no abd pain  Blood pressure (!) 156/66, pulse 87, temperature 98.3 °F (36.8 °C), temperature source Oral, resp. rate 18, height 5' 6.5" (1.689 m), weight 86.4 kg (190 lb 7.6 oz), SpO2 (!) 93 %, not currently breastfeeding.  abd soft, nl bs, ntnd, incs healing well  Path i9c4--o/w pt and   A/p s/p lap R colecotmy- adv diet, oob, IS, improving, consult Dr Laura for rec regarding any further tx in future per pt request  "

## 2019-09-17 NOTE — PT/OT/SLP PROGRESS
Physical Therapy Treatment/Discharge summary    Patient Name:  Ny Romeo   MRN:  [id number]    Recommendations:     Discharge Recommendations:  home   Discharge Equipment Recommendations: none   Barriers to discharge: None    Assessment:     Ny Romeo is a 64 y.o. female admitted with a medical diagnosis of Malignant neoplasm of appendix.  She presents with the following impairments/functional limitations:  (N/A) .  Pt has met all PT goals and is ok to ambulate in hallway with nursing or family    Recent Surgery: Procedure(s) (LRB):  COLECTOMY, RIGHT, LAPAROSCOPIC (Right)  CYSTOSCOPY, WITH URETERAL STENT INSERTION (Bilateral) 5 Days Post-Op    Plan:     During this hospitalization, patient to be seen (N/A, pt ok to ambulate in hallway with nursing staff or family) to address the identified rehab impairments via (N/A) and progress toward the following goals:    · Plan of Care Expires:  09/17/19    Subjective     Chief Complaint: no c.o  Patient/Family Comments/goals: Pt declined to practice stairs again stating that she will be fine to go home  Pain/Comfort:  · Pain Rating 1: 0/10      Objective:     Communicated with nsg prior to session.  Patient found HOB elevated with (N/A) upon PT entry to room.     General Precautions: Standard, fall   Orthopedic Precautions:N/A   Braces: N/A     Functional Mobility:  · Bed Mobility:     · Scooting: modified independence  · Supine to Sit: modified independence  · Sit to Supine: modified independence  · Transfers:     · Sit to Stand:  Modified independence with no AD  · Gait: 450' Mod I  · Balance: good      AM-PAC 6 CLICK MOBILITY  Turning over in bed (including adjusting bedclothes, sheets and blankets)?: 4  Sitting down on and standing up from a chair with arms (e.g., wheelchair, bedside commode, etc.): 4  Moving from lying on back to sitting on the side of the bed?: 4  Moving to and from a bed to a chair (including a wheelchair)?: 4  Need to walk in hospital  room?: 4  Climbing 3-5 steps with a railing?: 3  Basic Mobility Total Score: 23       Therapeutic Activities and Exercises:   ambulation only    Patient left HOB elevated with all lines intact, call button in reach, nsg notified and sposue present..    GOALS:   Multidisciplinary Problems     Physical Therapy Goals     Not on file          Multidisciplinary Problems (Resolved)        Problem: Physical Therapy Goal    Goal Priority Disciplines Outcome Goal Variances Interventions   Physical Therapy Goal   (Resolved)     PT, PT/OT Outcome(s) achieved     Description:  Goals to be met by: 2019     Patient will increase functional independence with mobility by performin. Supine to sit with Modified [place]  2. Sit to stand transfer with Modified [place]  3. Gait  x 200 feet with Modified [place] with or without AD.   4. Ascend/descend 10 stair with HR and Minimal Assistance    5. Lower extremity exercise program x10 reps per handout, with supervision                      Time Tracking:     PT Received On: 19  PT Start Time: 1300     PT Stop Time: 1309  PT Total Time (min): 9 min     Billable Minutes: Therapeutic Activity 9    Treatment Type: Treatment  PT/PTA: PT     PTA Visit Number: 0     Larissa Bishop, PT  2019

## 2019-09-17 NOTE — PLAN OF CARE
Problem: Physical Therapy Goal  Goal: Physical Therapy Goal  Goals to be met by: 2019     Patient will increase functional independence with mobility by performin. Supine to sit with Modified [place]  2. Sit to stand transfer with Modified [place]  3. Gait  x 200 feet with Modified [place] with or without AD.   4. Ascend/descend 10 stair with HR and Minimal Assistance    5. Lower extremity exercise program x10 reps per handout, with supervision     Outcome: Outcome(s) achieved Date Met: 19  Pt has met all PT goals and is ok for ambulation in hallway with family or nursing staff.  PT to sign off, no skilled PT or DMe needs.

## 2019-09-17 NOTE — SUBJECTIVE & OBJECTIVE
Oncology Treatment Plan:   [No treatment plan]    Medications:  Continuous Infusions:   dextrose 5 % and 0.45 % NaCl with KCl 20 mEq 50 mL/hr at 19 0600     Scheduled Meds:   enoxaparin  40 mg Subcutaneous Daily    famotidine  20 mg Oral BID     PRN Meds:hydrALAZINE, HYDROcodone-acetaminophen, ondansetron     Review of patient's allergies indicates:   Allergen Reactions    Penicillins         Past Medical History:   Diagnosis Date    Acid reflux     Cancer     adenocarcinoma of appendix     Past Surgical History:   Procedure Laterality Date    APPENDECTOMY, LAPAROSCOPIC N/A 2019    Performed by [facility], [facility] at [facility] OR     SECTION      COLECTOMY, RIGHT, LAPAROSCOPIC Right 2019    Performed by [facility] at [facility] OR    CYSTOSCOPY, WITH URETERAL STENT INSERTION Bilateral 2019    Performed by MAEVE North MD at [facility] OR    TONSILLECTOMY       Family History     None        Tobacco Use    Smoking status: Former Smoker     Types: Cigarettes     Last attempt to quit: 2017     Years since quittin.7    Smokeless tobacco: Never Used   Substance and Sexual Activity    Alcohol use: Yes     Alcohol/week: 0.6 oz     Types: 1 Cans of beer per week     Comment: socially    Drug use: No    Sexual activity: Yes     Partners: Male       Review of Systems   Constitutional: Negative for appetite change, fatigue, fever and unexpected weight change.   HENT: Negative for mouth sores.    Eyes: Negative for visual disturbance.   Respiratory: Negative for cough and shortness of breath.    Cardiovascular: Negative for chest pain.   Gastrointestinal: Negative for abdominal pain and diarrhea.   Genitourinary: Negative for frequency.   Musculoskeletal: Negative for back pain.   Skin: Negative for rash.   Neurological: Negative for headaches.   Hematological: Negative for adenopathy.   Psychiatric/Behavioral: The patient is not nervous/anxious.      Objective:      Vital Signs (Most Recent):  Temp: 98 °F (36.7 °C) (09/17/19 1159)  Pulse: 89 (09/17/19 1159)  Resp: 16 (09/17/19 1159)  BP: (!) 161/75 (09/17/19 1159)  SpO2: 98 % (09/17/19 1159) Vital Signs (24h Range):  Temp:  [98 °F (36.7 °C)-98.3 °F (36.8 °C)] 98 °F (36.7 °C)  Pulse:  [77-95] 89  Resp:  [16-18] 16  SpO2:  [93 %-98 %] 98 %  BP: (141-167)/(65-75) 161/75     Weight: 86.4 kg (190 lb 7.6 oz)  Body mass index is 30.28 kg/m².  Body surface area is 2.01 meters squared.      Intake/Output Summary (Last 24 hours) at 9/17/2019 1350  Last data filed at 9/17/2019 0830  Gross per 24 hour   Intake 1560 ml   Output --   Net 1560 ml       Physical Exam   Constitutional: She is oriented to person, place, and time. She appears well-developed and well-nourished.   HENT:   Head: Normocephalic.   Mouth/Throat: Oropharynx is clear and moist. No oropharyngeal exudate.   Eyes: Pupils are equal, round, and reactive to light. Conjunctivae and lids are normal. No scleral icterus.   Neck: Normal range of motion. Neck supple. No thyromegaly present.   Cardiovascular: Normal rate, regular rhythm and normal heart sounds.   No murmur heard.  Pulmonary/Chest: Breath sounds normal. She has no wheezes. She has no rales.   Abdominal: Soft. Bowel sounds are normal. She exhibits no distension and no mass. There is no hepatosplenomegaly. There is no tenderness. There is no rebound and no guarding.   Musculoskeletal: Normal range of motion. She exhibits no edema or tenderness.   Neurological: She is alert and oriented to person, place, and time. No cranial nerve deficit. Coordination normal.   Skin: Skin is warm and dry. No ecchymosis, no petechiae and no rash noted. No erythema.   Psychiatric: She has a normal mood and affect.       Significant Labs:   All pertinent labs within the past 24 hours have been reviewed    Diagnostic Results:  I have reviewed and interpreted all pertinent imaging results/findings within the past 24 hours

## 2019-09-17 NOTE — PLAN OF CARE
Problem: Infection  Goal: Infection Symptom Resolution    Intervention: Prevent or Manage Infection     09/17/19 0328   Prevent or Manage Infection   Fever Reduction/Comfort Measures fluid intake increased   Infection Management aseptic technique maintained         Problem: Fall Injury Risk  Goal: Absence of Fall and Fall-Related Injury  Outcome: Ongoing (interventions implemented as appropriate)  Intervention: Identify and Manage Contributors to Fall Injury Risk     09/17/19 0328   Manage Acute Allergic Reaction   Medication Review/Management medications reviewed;high risk medications identified   Identify and Manage Contributors to Fall Injury Risk   Self-Care Promotion independence encouraged;BADL personal objects within reach;BADL personal routines maintained     Intervention: Promote Injury-Free Environment     09/17/19 0328   Optimize [place] and Functional Mobility   Environmental Safety Modification assistive device/personal items within reach;clutter free environment maintained   Optimize Balance and Safe Activity   Safety Promotion/Fall Prevention bed alarm set;nonskid shoes/socks when out of bed;side rails raised x 2         Comments: Patient alert and oriented x4. Respirations even and unlabored. No distress noted. Skin warm and dry.wound to abdomen. Well approximated. Sutures and staples remains intact. Patient denies any pain. Patient educated on prn pain medication and availability. Patient refuses. Iv fluids infusing per md orders. Iv free from swelling and redness. Iv dressing clean, dry, and intact. Patient has no complaints or concerns. Instructed to call for any needs. Bed in lowest position. Call bell within reach. Will continue to monitor.

## 2019-09-17 NOTE — PLAN OF CARE
Problem: Adult Inpatient Plan of Care  Goal: Plan of Care Review  Outcome: Ongoing (interventions implemented as appropriate)     09/17/19 1441   Plan of Care Review   Plan of Care Reviewed With patient   Denies pain or discomfort. No falls or injuries noted.Afebrile. Ambulates to and from bathroom. Steady gait noted Iv fluids discontinues. Soft diet tolerated well. Staples intact .Extra fluids encouraged

## 2019-09-17 NOTE — PLAN OF CARE
Problem: Adult Inpatient Plan of Care  Goal: Plan of Care Review  Outcome: Ongoing (interventions implemented as appropriate)     09/16/19 1938   Plan of Care Review   Plan of Care Reviewed With patient   gen condition improving. Denies pain.No fall or injuries noted. Ambulating in hallway and to bathroom Extra fluid encouraged. Clear liquid tolerated well

## 2019-09-18 VITALS
RESPIRATION RATE: 18 BRPM | WEIGHT: 190.5 LBS | BODY MASS INDEX: 29.9 KG/M2 | OXYGEN SATURATION: 96 % | SYSTOLIC BLOOD PRESSURE: 164 MMHG | TEMPERATURE: 99 F | HEIGHT: 67 IN | DIASTOLIC BLOOD PRESSURE: 77 MMHG | HEART RATE: 82 BPM

## 2019-09-18 PROCEDURE — 25000003 PHARM REV CODE 250: Performed by: SURGERY

## 2019-09-18 RX ORDER — OXYCODONE AND ACETAMINOPHEN 5; 325 MG/1; MG/1
1 TABLET ORAL EVERY 4 HOURS PRN
Qty: 20 TABLET | Refills: 0 | Status: SHIPPED | OUTPATIENT
Start: 2019-09-18 | End: 2019-10-14

## 2019-09-18 RX ADMIN — FAMOTIDINE 20 MG: 20 TABLET ORAL at 08:09

## 2019-09-18 NOTE — PLAN OF CARE
Problem: Fall Injury Risk  Goal: Absence of Fall and Fall-Related Injury  Outcome: Ongoing (interventions implemented as appropriate)  Pt AAOX4. No distress noted. VSS. Free of injury and falls. Respirations even and unlabored. Safety maintained. Bed in lowest locked position. Call light within reach. Will continue to monitor.

## 2019-09-18 NOTE — NURSING
Pt educated on discharge instructions. Verbalizes understanding. PIV discontinued per order. Transport requested. Pt signed off by case mananagement.

## 2019-09-18 NOTE — DISCHARGE SUMMARY
[facility]  General Surgery  Discharge Summary      Patient Name: Ny Romeo  MRN: [id number]  Admission Date: 9/12/2019  Hospital Length of Stay: 6 days  Discharge Date and Time:  09/18/2019 6:18 AM  Attending Physician: Pio Castillo III,*   Discharging Provider: Bryan Cassidy MD  Primary Care Provider: Primary Doctor No    HPI:   64-year-old female admitted for elective right colectomy    Procedure(s) (LRB):  COLECTOMY, RIGHT, LAPAROSCOPIC (Right)  CYSTOSCOPY, WITH URETERAL STENT INSERTION (Bilateral)      Indwelling Lines/Drains at time of discharge:   Lines/Drains/Airways          None        Hospital Course: The patient underwent a laparoscopic right colectomy.  Postoperatively she was transferred to the floor.  Her Cueva was discontinued on postop day 1.  She was given clear liquid diet.  She continued to do well and mild amount of nausea on postop day 3., was having some diarrhea and bowel movements.  Her diet was slowly advanced and on postop day number 5 she was given a regular diet.  She did well and was discharged home.  During her stay Hematology-Oncology was consulted to discuss pathology and that she does not require chemotherapy.    Consults:   Consults (From admission, onward)        Status Ordering Provider     Inpatient consult to Hematology/Oncology - [facility]     Provider:  MD Pino Degroot ROBERT D. III          Significant Diagnostic Studies: Labs:   CBC   Recent Labs   Lab 09/16/19  0643   WBC 7.17   HGB 13.4   HCT 41.4          Pending Diagnostic Studies:     None        Final Active Diagnoses:    Diagnosis Date Noted POA    PRINCIPAL PROBLEM:  Malignant neoplasm of appendix [C18.1] 09/12/2019 Yes    Colon cancer [C18.9] 09/17/2019 Unknown      Problems Resolved During this Admission:      Discharged Condition: good    Disposition: Home or Self Care    Follow Up:  Follow-up Information     Pio Castillo III,  MD. Go on 9/26/2019.    Specialty:  Surgery  Why:  Outpatient Services, Post-op Surgery Follow-up Appointment, Please arrive to clinic for 1:40PM  Contact information:  [address] [place] [address] [phone]                 Patient Instructions:      Diet Adult Regular     Lifting restrictions   Order Comments: No lifting > 10 pounds for 2 weeks     Notify your health care provider if you experience any of the following:  temperature >100.4     Notify your health care provider if you experience any of the following:  redness, tenderness, or signs of infection (pain, swelling, redness, odor or green/yellow discharge around incision site)     No dressing needed   Order Comments: May shower of post-op day #1     Medications:  Reconciled Home Medications:      Medication List      START taking these medications    oxyCODONE-acetaminophen 5-325 mg per tablet  Commonly known as:  PERCOCET  Take 1 tablet by mouth every 4 (four) hours as needed for Pain.          Time spent on the discharge of patient: 25 minutes    Bryan Cassidy MD  General Surgery  [facility]

## 2019-09-18 NOTE — PLAN OF CARE
"SW met with patient to provide discharge follow-up instructions. SW reviewed with patient contents of "[facility] Packet" including "help at home", "things patient responsible for to manage her health at home" and "preferences". Patient was able to verbalize her help at home will be her spouse. SW discussed with patient how she will manage her health at home is by going on her doctor appointments, getting prescriptions filled and taking her medications. EDUCATION: Patient provided with educational information on Post-Operative Discharge Instructions. Information reviewed and placed in "My Healthcare Packet" to be brought home for patient to use as resource after discharge.  Information included:  signs and symptoms to look for and when to call the doctor if experiencing any symptoms that may indicate a medical emergency: CALL 911. All questions answered.  Teach back method used. Patient was able to verbalize understanding of signs and symptoms and managing her health by paying attention to having fever and diarrhea. ESME informed nurse Carol moran manger completed all discharge planning for patient and is clear to discharge from case management standpoint.         09/18/19 1119   Final Note   Assessment Type Final Discharge Note   Anticipated Discharge Disposition Home   Hospital Follow Up  Appt(s) scheduled? Yes   Discharge plans and expectations educations in teach back method with documentation complete? Yes   Right Care Referral Info   Post Acute Recommendation No Care     "

## 2019-09-18 NOTE — HOSPITAL COURSE
The patient underwent a laparoscopic right colectomy.  Postoperatively she was transferred to the floor.  Her Cueva was discontinued on postop day 1.  She was given clear liquid diet.  She continued to do well and mild amount of nausea on postop day 3., was having some diarrhea and bowel movements.  Her diet was slowly advanced and on postop day number 5 she was given a regular diet.  She did well and was discharged home.  During her stay Hematology-Oncology was consulted to discuss pathology and that she does not require chemotherapy.

## 2019-09-18 NOTE — PLAN OF CARE
Problem: Adult Inpatient Plan of Care  Goal: Plan of Care Review  Outcome: Ongoing (interventions implemented as appropriate)     09/18/19 0418   Plan of Care Review   Plan of Care Reviewed With patient      09/18/19 0418   Plan of Care Review   Plan of Care Reviewed With patient   No falls, trauma or injury this shift.surgical incisions without redness, warmth or edema. Patient did not voice any complaints of pain this shift. Patient ambulated to bathroom with difficulty . continue with plan of care and continue to monitor .

## 2019-09-18 NOTE — PROGRESS NOTES
Follow-up Information     Pio Castillo III, MD. Go on 9/26/2019.    Specialty:  Surgery  Why:  Outpatient Services, Post-op Surgery Follow-up Appointment, Please arrive to clinic for 1:40PM  Contact information:  [address] [place] [address]  [phone]             Gisselle Madera MD. Go on 10/14/2019.    Specialties:  Hematology and Oncology, Hematology  Why:  Outpatient Services, Oncology Follow-up Appointment, Please arrive to clinic for 10:00AM  Contact information:  [address]  [address] [place] [address]  [phone]                         [facility] CASE MANAGEMENT                  WRITTEN DISCHARGE INFORMATION      APPOINTMENTS AND RESOURCES TO HELP YOU MANAGE YOUR CARE AT HOME BASED ON YOUR PREFERENCES:  (If an appointment is not scheduled for you when you leave the hospital, call your doctor to schedule a follow up visit within a week)        Healthy Living Instructions to HELP MANAGE YOUR CARE AT HOME:  Things You are responsible for:  1.    Getting your prescriptions filled   2.    Taking your medications as directed, DO NOT MISS ANY DOSES!  3.    Following the diet and exercise recommended by your doctor  4.    Going to your follow-up doctor appointment. This is important because it allows the doctor to monitor your progress and determine if any changes need to made to your treatment plan.  5. If you have any questions about MANAGING YOUR CARE AT HOME Call the Nurse Care Line for 24/7 Assistance [phone]       Please answer any calls you may receive from [facility]. We want to continue to support you as you manage your healthcare needs. [facility] is happy to have the opportunity to serve you.      Thank you for choosing [facility] for your healthcare needs!  Your [facility] Case Management Team,  BRADLY Antunez, LCSW

## 2019-10-14 ENCOUNTER — OFFICE VISIT (OUTPATIENT)
Dept: HEMATOLOGY/ONCOLOGY | Facility: CLINIC | Age: 64
End: 2019-10-14
Payer: COMMERCIAL

## 2019-10-14 VITALS
TEMPERATURE: 99 F | WEIGHT: 185.19 LBS | OXYGEN SATURATION: 97 % | BODY MASS INDEX: 29.76 KG/M2 | HEIGHT: 66 IN | DIASTOLIC BLOOD PRESSURE: 78 MMHG | SYSTOLIC BLOOD PRESSURE: 167 MMHG | HEART RATE: 90 BPM

## 2019-10-14 DIAGNOSIS — C18.1 ADENOCARCINOMA OF APPENDIX: Primary | ICD-10-CM

## 2019-10-14 DIAGNOSIS — C18.9 MALIGNANT NEOPLASM OF COLON, UNSPECIFIED PART OF COLON: ICD-10-CM

## 2019-10-14 PROCEDURE — 99999 PR PBB SHADOW E&M-EST. PATIENT-LVL III: CPT | Mod: PBBFAC,,, | Performed by: INTERNAL MEDICINE

## 2019-10-14 PROCEDURE — 99214 OFFICE O/P EST MOD 30 MIN: CPT | Mod: S$GLB,,, | Performed by: INTERNAL MEDICINE

## 2019-10-14 PROCEDURE — 99214 PR OFFICE/OUTPT VISIT, EST, LEVL IV, 30-39 MIN: ICD-10-PCS | Mod: S$GLB,,, | Performed by: INTERNAL MEDICINE

## 2019-10-14 PROCEDURE — 99999 PR PBB SHADOW E&M-EST. PATIENT-LVL III: ICD-10-PCS | Mod: PBBFAC,,, | Performed by: INTERNAL MEDICINE

## 2019-10-14 NOTE — PROGRESS NOTES
Subjective:       Patient ID: Ny Romeo is a 64 y.o. female.    Chief Complaint: Follow-up         Diagnosis : Adenocarcinoma of appendix  setting of acute appendicitis complicated by perforation status post laparoscopic appendectomy 5/20/2019  pT1 pN0   She underwent lap R colectomy on 9/12/2019 pathologic stage  [id number]       Pt is a 65 y/o female with no significant past medical history seen today for  recently diagnosed adenocarcinoma of the appendix. She was admitted to [facility] on 5/20/2019  with acute appendicitis complicated by perforation s/p laparoscopic appendectomy 5/20/19. 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 Proximal Margins uninvolved.  No lymphovascular invasion. Five lymph nodes negative for neoplasm. Pathologic Stage Classification (pTNM, AJCC 8th Edition):Primary Tumor (pT): pT1 pN0 Hospital course complicated by post-op subjective fevers with f/u CT showing a 1.5 x 2.1 x 1.6-cm fluid collection at the appendectomy site that measures simple fluid density and additional 2.9 x 6.0 x 3.1-cm recto-vaginal fluid collection which also measures simple fluid density which was presumed to be an abscess that was drained percutaneously, but it appeared to be a noninfected. She had never undergone a screening colonoscopy.  She has no family history of GI cancers.  She has no had regular follow-up medical care. She has no PCP. She is overdue for screening mammography.     She has subsequently undergone a colonoscopy, which reveals a suspicious mass in the cecum   She underwent her first screening colonoscopy 8/2019 and was diagnosed with a cecal mass. Colonoscopy showed a mass in the cecum at the appendiceal orifice with biopsy revealing villous adenoma atypia.  She underwent lap R colectomy on 9/12/2019 pathologic stage  [id number]       Past Medical History:   Diagnosis Date    Acid reflux     Cancer     adenocarcinoma of appendix         Past Surgical History:  "  Procedure Laterality Date     SECTION      CYSTOSCOPY W/ URETERAL STENT PLACEMENT Bilateral 2019    Procedure: CYSTOSCOPY, WITH URETERAL STENT INSERTION;  Surgeon: MAEVE North MD;  Location: [facility] OR;  Service: Urology;  Laterality: Bilateral;  DR NORTH TO CONSENT IN AM    LAPAROSCOPIC APPENDECTOMY N/A 2019    Procedure: APPENDECTOMY, LAPAROSCOPIC;  Surgeon: Pio Castillo III, MD;  Location: [facility] OR;  Service: General;  Laterality: N/A;    LAPAROSCOPIC RIGHT COLON RESECTION Right 2019    Procedure: COLECTOMY, RIGHT, LAPAROSCOPIC;  Surgeon: Pio Castillo III, MD;  Location: [facility] OR;  Service: General;  Laterality: Right;  WITH DR. NORTH  RN PREOP 2019----T/S    TONSILLECTOMY       Review of Systems   Constitutional: Negative for appetite change, fatigue, fever and unexpected weight change.   HENT: Negative for mouth sores.    Eyes: Negative for visual disturbance.   Respiratory: Negative for cough and shortness of breath.    Cardiovascular: Negative for chest pain.   Gastrointestinal: Negative for abdominal pain and diarrhea.   Genitourinary: Negative for frequency.   Musculoskeletal: Negative for back pain.   Skin: Negative for rash.   Neurological: Negative for headaches.   Hematological: Negative for adenopathy.   Psychiatric/Behavioral: The patient is not nervous/anxious.        Objective:        Vitals:    10/14/19 0951   BP: (!) 167/78   BP Location: Right arm   Patient Position: Sitting   BP Method: Medium (Automatic)   Pulse: 90   Temp: 98.7 °F (37.1 °C)   TempSrc: Oral   SpO2: 97%   Weight: 84 kg (185 lb 3 oz)   Height: 5' 6" (1.676 m)       Physical Exam   Constitutional: She is oriented to person, place, and time. She appears well-developed and well-nourished.   HENT:   Head: Normocephalic.   Mouth/Throat: Oropharynx is clear and moist. No oropharyngeal exudate.   Eyes: Pupils are equal, round, and reactive to light. Conjunctivae and lids are normal. " No scleral icterus.   Neck: Normal range of motion. Neck supple. No thyromegaly present.   Cardiovascular: Normal rate, regular rhythm and normal heart sounds.   No murmur heard.  Pulmonary/Chest: Breath sounds normal. She has no wheezes. She has no rales.   Abdominal: Soft. Bowel sounds are normal. She exhibits no distension and no mass. There is no hepatosplenomegaly. There is no tenderness. There is no rebound and no guarding.   Musculoskeletal: Normal range of motion. She exhibits no edema or tenderness.   Lymphadenopathy:     She has no cervical adenopathy.     She has no axillary adenopathy.        Right: No supraclavicular adenopathy present.        Left: No supraclavicular adenopathy present.   Neurological: She is alert and oriented to person, place, and time. No cranial nerve deficit. Coordination normal.   Skin: Skin is warm and dry. No ecchymosis, no petechiae and no rash noted. No erythema.   Psychiatric: She has a normal mood and affect.           CT a/p w/contrast 5/26/2019   1.  Two partially loculated fluid collections are seen in the pelvis in this patient status post recent laparoscopic appendectomy.  Abscess formation is within the differential.  There is a 3rd complex structure in-between the fluid collections that is in the expected location of the patient's right ovary when compared to the CT from 05/20/2019.  This could represent interval enlargement of the patient's right ovary secondary to local inflammation from the recent surgery and fluid collection formation.  Alternatively, this complex structure could just represent a complex 3rd fluid collection.    2.  Hepatic steatosis.    3.  A couple of tiny scattered pancreatic calcifications raise the possibility of chronic or remote pancreatitis.    4. Diverticulosis coli.        Appendix, appendectomy:  - Appendiceal adenocarcinoma, G1 (well-differentiated), 17mm in greatest dimension, with invasion into lamina  propria  - Proximal  appendiceal staple margin uninvolved by carcinoma  - No lymphovascular invasion identified on sections examined  - Five lymph nodes negative for metastatic carcinoma (0/5)  - Acute suppurative appendicitis and periappendicitis  Note: Dr. DERRELL Castillo was informed of the findings on 5/27/19 at 1422 hours. Tumor is present on slides E and H  for further testing as needed.  CAP Summary: Appendix  Procedure: Appendectomy  Tumor Site: Proximal half of appendix  Base of appendix uninvolved by tumor  Tumor Size: 17 x 10 x 6mm  Histologic Type: Adenocarcinoma  Report        Histologic Grade: G1: Well differentiated  Tumor Extension: Tumor invades lamina propria or muscularis mucosa  Margins: Uninvolved by invasive carcinoma  Lymphovascular invasion: Not identified  Tumor Deposits: Not identified  Regional Lymph Nodes:  Number of lymph nodes involved: 0  Number of lymph nodes examined: 5  Pathologic Stage Classification (pTNM, AJCC 8th Edition):  Primary Tumor (pT): pT1: Tumor invades the submucosa (through the muscularis mucosa but not into the  muscularis propria)  Regional Lymph Nodes (pN): pN0: No regional lymph node metastasis  Additional Pathologic Findings:  - Acute suppurative appendicitis and periappendicitis          FINAL PATHOLOGIC DIAGNOSIS  1. Colon, right, hemicolectomy:  -WELL-DIFFERENTIATED INVASIVE ADENOCARCINOMA, 2.1 CM, INVOLVING THE MUSCULARIS PROPRIA,  ([id number]), see synoptic report  -TWENTY-THREE LYMPH NODES NEGATIVE FOR METASTATIC ADENOCARCINOMA (0/23)  [facility]  Report    SURGICAL MARGINS ARE NEGATIVE FOR CARCINOMA  2. Colon, products of staple anastomosis, excision:  -FRAGMENTS OF ILEUM AND COLON, NEGATIVE FOR DYSPLASIA OR MALIGNANCY  SYNOPTIC REPORT (COLON AND RECTUM):  Procedure: Right hemicolectomy  Tumor site: Cecum  Tumor size: 2.1 x 2.0 x 1.5 cm  Histologic type: Adenocarcinoma  Histologic grade: G1, well-differentiated  Tumor extension: Tumor invades the muscularis propria  Margins: All margins  are uninvolved by invasive carcinoma, high-grade dysplasia, intramucosal adenocarcinoma,  and adenoma  Proximal margin: Uninvolved by invasive carcinoma. Distance of tumor from margin: 7.5 cm  Distal margin: Uninvolved by invasive carcinoma. Distance of tumor from margin: 16.8 cm  Radial or mesenteric margin: Uninvolved by invasive carcinoma. Distance of tumor from margin: 6.5 cm  Treatment effect: No known presurgical treatment  Lymphovascular invasion: Not identified  Perineural invasion: Not identified  Regional lymph nodes:  Number of lymph nodes involved: 0  Number of lymph nodes examined: 23  Pathologic stage classification (pTNM, AJCC 8th Edition): [id number]  Note: MMR panel will be performed and reported in an addendum to follow.    Assessment:       1. Adenocarcinoma of appendix    2. Malignant neoplasm of colon, unspecified part of colon        Plan:       1.  63 y/o female with adenocarcinoma of appendix presenting in setting of acute appendicitis complicated by perforation status post laparoscopic appendectomy 5/20/2019. .Pathology showed Pathology showed appendiceal adenocarcinoma, G1 well -differentiated, 17mm. Proximal Margins uninvolved.  No lymphovascular invasion. Five lymph nodes negative for neoplasm. Pathologic Stage Classification (pTNM, AJCC 8th Edition):Primary Tumor (pT): pT1 pN0  Stage 1 [id number].   CT imaging a/p did not reveal any evidence of distant disease. CXR negative.    In this setting,no strong indication for  Adjuvant chemotherapy Stage 1 disease , even in setting of perforation with stage 1 disease as these are often treated like adenocarcinomas of colon.       2.  Patient is s/p lap R colectomy with pathologic staging  [id number]   9/12/2019       Pathology shows adenocarcinoma, G1   23 lymph nodes removed negative for neoplasm   Pre op CEA 1.3ng/ml   Pt has Stage 1 [id number]  Adenocarcinoma of colon   No indication for adjuvant chemo in stage 1     Pt will be presented to GI tumor  board for evaluation for possible prophylactic HIPEC? due to risk of peritoneal carcinomatosis  Discussed with her treating surgeon , Dr. Castillo and peritoneum was visualized and no evidence of carcinomatosis    F/u with PCP  Pt in process of establishing care with PCP     Cc: Pio Castillo III, MD

## 2019-10-22 ENCOUNTER — OFFICE VISIT (OUTPATIENT)
Dept: FAMILY MEDICINE | Facility: CLINIC | Age: 64
End: 2019-10-22
Payer: COMMERCIAL

## 2019-10-22 VITALS
WEIGHT: 186.94 LBS | HEART RATE: 99 BPM | OXYGEN SATURATION: 98 % | SYSTOLIC BLOOD PRESSURE: 140 MMHG | RESPIRATION RATE: 17 BRPM | TEMPERATURE: 99 F | BODY MASS INDEX: 30.04 KG/M2 | HEIGHT: 66 IN | DIASTOLIC BLOOD PRESSURE: 98 MMHG

## 2019-10-22 DIAGNOSIS — Z12.39 BREAST CANCER SCREENING: ICD-10-CM

## 2019-10-22 DIAGNOSIS — I10 ESSENTIAL HYPERTENSION: ICD-10-CM

## 2019-10-22 DIAGNOSIS — C18.1 ADENOCARCINOMA OF APPENDIX: Primary | ICD-10-CM

## 2019-10-22 DIAGNOSIS — C18.9 MALIGNANT NEOPLASM OF COLON, UNSPECIFIED PART OF COLON: ICD-10-CM

## 2019-10-22 DIAGNOSIS — Z00.00 HEALTHCARE MAINTENANCE: ICD-10-CM

## 2019-10-22 PROCEDURE — 99204 PR OFFICE/OUTPT VISIT, NEW, LEVL IV, 45-59 MIN: ICD-10-PCS | Mod: S$GLB,,, | Performed by: INTERNAL MEDICINE

## 2019-10-22 PROCEDURE — 99204 OFFICE O/P NEW MOD 45 MIN: CPT | Mod: S$GLB,,, | Performed by: INTERNAL MEDICINE

## 2019-10-22 PROCEDURE — 99999 PR PBB SHADOW E&M-EST. PATIENT-LVL V: CPT | Mod: PBBFAC,,, | Performed by: INTERNAL MEDICINE

## 2019-10-22 PROCEDURE — 99999 PR PBB SHADOW E&M-EST. PATIENT-LVL V: ICD-10-PCS | Mod: PBBFAC,,, | Performed by: INTERNAL MEDICINE

## 2019-10-22 RX ORDER — AMLODIPINE BESYLATE 2.5 MG/1
2.5 TABLET ORAL DAILY
Qty: 30 TABLET | Refills: 11 | Status: SHIPPED | OUTPATIENT
Start: 2019-10-22 | End: 2019-11-07 | Stop reason: ALTCHOICE

## 2019-10-22 NOTE — PATIENT INSTRUCTIONS
Low-Salt Diet  This diet removes foods that are high in salt. It also limits the amount of salt you use when cooking. It is most often used for people with high blood pressure, edema (fluid retention), and kidney, liver, or heart disease.  Table salt contains the mineral sodium. Your body needs sodium to work normally. But too much sodium can make your health problems worse. Your healthcare provider is recommending a low-salt (also called low-sodium) diet for you. Your total daily allowance of salt is 1,500 to 2,300 milligrams (mg). It is less than 1 teaspoon of table salt. This means you can have only about 500 to 700 mg of sodium at each meal. People with certain health problems should limit salt intake to the lower end of the recommended range.    When you cook, dont add much salt. If you can cook without using salt, even better. Dont add salt to your food at the table.  When shopping, read food labels. Salt is often called sodium on the label. Choose foods that are salt-free, low salt, or very low salt. Note that foods with reduced salt may not lower your salt intake enough.    Beans, potatoes, and pasta  Ok: Dry beans, split peas, lentils, potatoes, rice, macaroni, pasta, spaghetti without added salt  Avoid: Potato chips, tortilla chips, and similar products  Breads and cereals  Ok: Low-sodium breads, rolls, cereals, and cakes; low-salt crackers, matzo crackers  Avoid: Salted crackers, pretzels, popcorn, Telugu toast, pancakes, muffins  Dairy  Ok: Milk, chocolate milk, hot chocolate mix, low-salt cheeses, and yogurt  Avoid: Processed cheese and cheese spreads; Roquefort, Camembert, and cottage cheese; buttermilk, instant breakfast drink  Desserts  Ok: Ice cream, frozen yogurt, juice bars, gelatin, cookies and pies, sugar, honey, jelly, hard candy  Avoid: Most pies, cakes and cookies prepared or processed with salt; instant pudding  Drinks  Ok: Tea, coffee, fizzy (carbonated) drinks, juices  Avoid: Flavored  coffees, electrolyte replacement drinks, sports drinks  Meats  Ok: All fresh meat, fish, poultry, low-salt tuna, eggs, egg substitute  Avoid: Smoked, pickled, brine-cured, or salted meats and fish. This includes dempsey, chipped beef, corned beef, hot dogs, deli meats, ham, kosher meats, salt pork, sausage, canned tuna, salted codfish, smoked salmon, herring, sardines, or anchovies.  Seasonings and spices  Ok: Most seasonings are okay. Good substitutes for salt include: fresh herb blends, hot sauce, lemon, garlic, garcia, vinegar, dry mustard, parsley, cilantro, horseradish, tomato paste, regular margarine, mayonnaise, unsalted butter, cream cheese, vegetable oil, cream, low-salt salad dressing and gravy.  Avoid: Regular ketchup, relishes, pickles, soy sauce, teriyaki sauce, Worcestershire sauce, BBQ sauce, tartar sauce, meat tenderizer, chili sauce, regular gravy, regular salad dressing, salted butter  Soups  Ok: Low-salt soups and broths made with allowed foods  Avoid: Bouillon cubes, soups with smoked or salted meats, regular soup and broth  Vegetables  Ok: Most vegetables are okay; also low-salt tomato and vegetable juices  Avoid: Sauerkraut and other brine-soaked vegetables; pickles and other pickled vegetables; tomato juice, olives  Date Last Reviewed: 8/1/2016 © 2000-2017 [employer]. [address] [place] [id number]. All rights reserved. This information is not intended as a substitute for professional medical care. Always follow your healthcare professional's instructions.        Eating Heart-Healthy Food: Using the DASH Plan    Eating for your heart doesnt have to be hard or boring. You just need to know how to make healthier choices. The DASH eating plan has been developed to help you do just that. DASH stands for Dietary Approaches to Stop Hypertension. It is a plan that has been proven to be healthier for your heart and to lower your risk for high blood pressure. It can also help  lower your risk for cancer, heart disease, osteoporosis, and diabetes.  Choosing from each food group  Choose foods from each of the food groups below each day. Try to get the recommended number of servings for each food group. The serving numbers are based on a diet of 2,000 calories a day. Talk to your doctor if youre unsure about your calorie needs. Along with getting the correct servings, the DASH plan also recommends a sodium intake less than 2,300 mg per day.        Grains  Servings: 6 to 8 a day  A serving is:  · 1 slice bread  · 1 ounce dry cereal  · Half a cup cooked rice, pasta or cereal  Best choices: Whole grains and any grains high in fiber. Vegetables  Servings: 4 to 5 a day  A serving is:  · 1 cup raw leafy vegetable  · Half a cup cut-up raw or cooked vegetable  · Half a cup vegetable juice  Best choices: Fresh or frozen vegetables prepared without added salt or fat.   Fruits  Servings: 4 to 5 a day  A serving is:  · 1 medium fruit  · One-quarter cup dried fruit  · Half a cup fresh, frozen, or canned fruit  · Half a cup of 100% fruit juices  Best choices: A variety of fresh fruits of different colors. Whole fruits are a better choice than fruit juices. Low-fat or fat-free dairy  Servings: 2 to 3 a day  A serving is:  · 1 cup milk  · 1 cup yogurt  · One and a half ounces cheese  Best choices: Skim or 1% milk, low-fat or fat-free yogurt or buttermilk, and low-fat cheeses.         Lean meats, poultry, fish  Servings: 6 or fewer a day  A serving is:  · 1 ounce cooked meats, poultry, or fish  · 1 egg  Best choices: Lean poultry and fish. Trim away visible fat. Broil, grill, roast, or boil instead of frying. Remove skin from poultry before eating. Limit how much red meat you eat.  Nuts, seeds, beans  Servings: 4 to 5 a week  A serving is:  · One-third cup nuts (one and a half ounces)  · 2 tablespoons nut butter or seeds  · Half a cup cooked dry beans or legumes  Best choices: Dry roasted nuts with no salt  added, lentils, kidney beans, garbanzo beans, and whole bahena beans.   Fats and oils  Servings: 2 to 3 a day  A serving is:  · 1 teaspoon vegetable oil  · 1 teaspoon soft margarine  · 1 tablespoon mayonnaise  · 2 tablespoons salad dressing  Best choices: Nut and vegetable oils (nontropical vegetable oils), such as olive and canola oil. Sweets  Servings: 5 a week or fewer  A serving is:  · 1 tablespoon sugar, maple syrup, or honey  · 1 tablespoon jam or jelly  · 1 half-ounce jelly beans (about 15)  · 1 cup lemonade  Best choices: Dried fruit can be a satisfying sweet. Choose low-fat sweets. And watch your serving sizes!      For more on the DASH eating plan, visit:  www.nhlbi.nih.gov/health/health-topics/topics/dash   Date Last Reviewed: 6/1/2016 © 2000-2017 [employer]. [address] [place] [address]. All rights reserved. This information is not intended as a substitute for professional medical care. Always follow your healthcare professional's instructions.        Low-Salt Choices  Eating salt (sodium) can make your body retain too much water. Excess water makes your heart work harder. Canned, packaged, and frozen foods are easy to prepare, but they are often high in sodium. Here are some ideas for low-salt foods you can easily prepare yourself.    For breakfast  · Fruit or 100% fruit juice  · Whole-wheat bread or an English muffin. Compare sodium content on labels.  · Low-fat milk or yogurt  · Unsalted eggs  · Shredded wheat  · Corn tortillas  · Unsalted steamed rice  · Regular (not instant) hot cereal, made without salt  Stay away from:  · Sausage, dempsey, and ham  · Flour tortillas  · Packaged muffins, pancakes, and biscuits  · Instant hot cereals  · Cottage cheese  For lunch and dinner  · Fresh fish, chicken, turkey, or meat--baked, broiled, or roasted without salt  · Dry beans, cooked without salt  · Tofu, stir-fried without salt  · Unsalted fresh fruit and vegetables, or frozen or  canned fruit and vegetables with no added salt  Stay away from:  · Lunch or deli meat that is cured or smoked  · Cheese  · Tomato juice and catsup  · Canned vegetables, soups, and fish not labeled as no-salt-added or reduced sodium  · Packaged gravies and sauces  · Olives, pickles, and relish  · Bottled salad dressings  For snacks and desserts  · Yogurt  · Unsalted, air popped popcorn  · Unsalted nuts or seeds  Stay away from:  · Pies and cakes  · Packaged dessert mixes  · Pizza  · Canned and packaged puddings  · Pretzels, chips, crackers, and nuts--unless the label says unsalted  Date Last Reviewed: 6/17/2015 © 2000-2017 [employer]. [address] [place] [address]. All rights reserved. This information is not intended as a substitute for professional medical care. Always follow your healthcare professional's instructions.        Amlodipine tablets  What is this medicine?  AMLODIPINE (am LEIGH ANN di pejhon) is a calcium-channel blocker. It affects the amount of calcium found in your heart and muscle cells. This relaxes your blood vessels, which can reduce the amount of work the heart has to do. This medicine is used to lower high blood pressure. It is also used to prevent chest pain.  How should I use this medicine?  Take this medicine by mouth with a glass of water. Follow the directions on the prescription label. Take your medicine at regular intervals. Do not take more medicine than directed.  Talk to your pediatrician regarding the use of this medicine in children. Special care may be needed. This medicine has been used in children as young as 6.  Persons over 65 years old may have a stronger reaction to this medicine and need smaller doses.  What side effects may I notice from receiving this medicine?  Side effects that you should report to your doctor or health care professional as soon as possible:  · allergic reactions like skin rash, itching or hives, swelling of the face, lips, or  tongue  · breathing problems  · changes in vision or hearing  · chest pain  · fast, irregular heartbeat  · swelling of legs or ankles  Side effects that usually do not require medical attention (report to your doctor or health care professional if they continue or are bothersome):  · dry mouth  · facial flushing  · nausea, vomiting  · stomach gas, pain  · tired, weak  · trouble sleeping  What may interact with this medicine?  · herbal or dietary supplements  · local or general anesthetics  · medicines for high blood pressure  · medicines for prostate problems  · rifampin  What if I miss a dose?  If you miss a dose, take it as soon as you can. If it is almost time for your next dose, take only that dose. Do not take double or extra doses.  Where should I keep my medicine?  Keep out of the reach of children.  Store at room temperature between 59 and 86 degrees F (15 and 30 degrees C). Protect from light. Keep container tightly closed. Throw away any unused medicine after the expiration date.  What should I tell my health care provider before I take this medicine?  They need to know if you have any of these conditions:  · heart problems like heart failure or aortic stenosis  · liver disease  · an unusual or allergic reaction to amlodipine, other medicines, foods, dyes, or preservatives  · pregnant or trying to get pregnant  · breast-feeding  What should I watch for while using this medicine?  Visit your doctor or health care professional for regular check ups. Check your blood pressure and pulse rate regularly. Ask your health care professional what your blood pressure and pulse rate should be, and when you should contact him or her.  This medicine may make you feel confused, dizzy or lightheaded. Do not drive, use machinery, or do anything that needs mental alertness until you know how this medicine affects you. To reduce the risk of dizzy or fainting spells, do not sit or stand up quickly, especially if you are an  older patient. Avoid alcoholic drinks; they can make you more dizzy.  Do not suddenly stop taking amlodipine. Ask your doctor or health care professional how you can gradually reduce the dose.  NOTE:This sheet is a summary. It may not cover all possible information. If you have questions about this medicine, talk to your doctor, pharmacist, or health care provider. Copyright© 2017 [employer]

## 2019-10-22 NOTE — PROGRESS NOTES
HISTORY OF PRESENT ILLNESS:  Ny Romeo is a 64 y.o. 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    Was initially admitted [facility] for acute appendicitis 2019 complicated by perforation s/p lap appendectomy at that admission. Path showed adenocarcinoma.  Never had colonoscopy prior then had screening colonoscopy 2019 showing cecal mass after which is S/p lap right colectomy 19.  Lymph nodes negative for malignancy.  To be discussed at tumor board for prophylactic treatment due to risk of peritoneal carcinomatosis.    She has brought record of home blood pressure readings and most are either borderline or slightly elevated.  Denies any symptoms.    PAST MEDICAL HISTORY:  Past Medical History:   Diagnosis Date    Acid reflux     Cancer     adenocarcinoma of appendix       PAST SURGICAL HISTORY:  Past Surgical History:   Procedure Laterality Date     SECTION      CYSTOSCOPY W/ URETERAL STENT PLACEMENT Bilateral 2019    Procedure: CYSTOSCOPY, WITH URETERAL STENT INSERTION;  Surgeon: MAEVE North MD;  Location: [facility] OR;  Service: Urology;  Laterality: Bilateral;  DR NORTH TO CONSENT IN AM    LAPAROSCOPIC APPENDECTOMY N/A 2019    Procedure: APPENDECTOMY, LAPAROSCOPIC;  Surgeon: Pio Castillo III, MD;  Location: [facility] OR;  Service: General;  Laterality: N/A;    LAPAROSCOPIC RIGHT COLON RESECTION Right 2019    Procedure: COLECTOMY, RIGHT, LAPAROSCOPIC;  Surgeon: Pio Castillo III, MD;  Location: [facility] OR;  Service: General;  Laterality: Right;  WITH DR. NORTH  RN PREOP 2019----T/S    TONSILLECTOMY         SOCIAL HISTORY:  Social History     Socioeconomic History    Marital status:      Spouse name: Not on file    Number of children: Not on file    Years of education: Not on file    Highest education level: Not on file   Occupational History    Not on file   Social  Needs    Financial resource strain: Not on file    Food insecurity:     Worry: Not on file     Inability: Not on file    Transportation needs:     Medical: Not on file     Non-medical: Not on file   Tobacco Use    Smoking status: Former Smoker     Types: Cigarettes     Last attempt to quit: 2017     Years since quittin.8    Smokeless tobacco: Never Used   Substance and Sexual Activity    Alcohol use: Yes     Alcohol/week: 1.0 standard drinks     Types: 1 Cans of beer per week     Comment: socially    Drug use: No    Sexual activity: Yes     Partners: Male   Lifestyle    Physical activity:     Days per week: Not on file     Minutes per session: Not on file    Stress: Not on file   Relationships    Social connections:     Talks on phone: Not on file     Gets together: Not on file     Attends Faith service: Not on file     Active member of club or organization: Not on file     Attends meetings of clubs or organizations: Not on file     Relationship status: Not on file   Other Topics Concern    Not on file   Social History Narrative    Not on file       FAMILY HISTORY:  No family history on file.    ALLERGIES AND MEDICATIONS: updated and reviewed.  Review of patient's allergies indicates:   Allergen Reactions    Penicillins             CARE TEAM:  Patient Care Team:  Yoandy Jensen MD as PCP - General (Internal Medicine)         REVIEW OF SYSTEMS:  Review of Systems   Constitutional: Negative for chills, fatigue and fever.   HENT: Positive for postnasal drip. Negative for congestion.    Eyes: Negative for photophobia and visual disturbance.   Respiratory: Negative for cough and shortness of breath.    Cardiovascular: Negative for chest pain and palpitations.   Gastrointestinal: Negative for abdominal pain, constipation, diarrhea, nausea and vomiting.   Genitourinary: Negative for dysuria, frequency, hematuria and vaginal bleeding.   Musculoskeletal: Negative for neck pain.   Neurological: Negative  for dizziness, syncope and headaches.   Psychiatric/Behavioral: Negative for dysphoric mood and sleep disturbance. The patient is not nervous/anxious.          PHYSICAL EXAM:   Vitals:    10/22/19 1333   BP: (!) 140/98   Pulse: 99   Resp: 17   Temp: 98.6 °F (37 °C)             Body mass index is 30.17 kg/m².     General appearance - alert, well appearing, and in no distress and obese  Mental status - normal mood, behavior, speech, dress, motor activity, and thought processes  Eyes - sclera anicteric, left eye normal, right eye normal  Ears - bilateral TM's and external ear canals normal  Mouth - mucous membranes moist, pharynx normal without lesions  Chest - clear to auscultation, no wheezes, rales or rhonchi, symmetric air entry, no tachypnea, retractions or cyanosis  Abdomen - soft, nontender, nondistended, no masses or organomegaly  no rebound tenderness noted  scars from previous incisions healing well  Neurological - alert, oriented, normal speech, no focal findings or movement disorder noted, motor and sensory grossly normal bilaterally  Extremities - no pedal edema noted, intact peripheral pulses      ASSESSMENT AND PLAN:  1. Adenocarcinoma of appendix  2. Malignant neoplasm of colon, unspecified part of colon  - recent records reviewed, to follow up with heme-onc    3. Healthcare maintenance  - Hemoglobin A1c; Future  - Comprehensive metabolic panel; Future  - Lipid panel; Future  - CBC auto differential; Future  - TSH; Future  - T4, free; Future  - Vitamin D; Future  - Hepatitis C antibody; Future  - Ambulatory referral to Obstetrics / Gynecology    4. Breast cancer screening  - Mammo Digital Screening Bilat; Future    5. Essential hypertension  - to keep log and return for nurse check in one week.  - amLODIPine (NORVASC) 2.5 MG tablet; Take 1 tablet (2.5 mg total) by mouth once daily.  Dispense: 30 tablet; Refill: 11  - discussed DASH diet    Discussed all age appropriate screenings moving forward.          Follow up 3 months or sooner as needed.  Answers for HPI/ROS submitted by the patient on 10/15/2019   Hypertension  Chronicity: new  Onset: 1 to 4 weeks ago  Progression since onset: waxing and waning  Condition status: controlled  anxiety: No  blurred vision: No  malaise/fatigue: Yes  orthopnea: No  peripheral edema: No  PND: No  sweats: No  Agents associated with hypertension: no associated agents  CAD risks: family history  Compliance problems: no compliance problems  Past treatments: nothing  Improvement on treatment: mild

## 2019-10-23 ENCOUNTER — HOSPITAL ENCOUNTER (OUTPATIENT)
Dept: RADIOLOGY | Facility: HOSPITAL | Age: 64
Discharge: HOME | End: 2019-10-23
Attending: INTERNAL MEDICINE
Payer: COMMERCIAL

## 2019-10-23 ENCOUNTER — DOCUMENTATION ONLY (OUTPATIENT)
Dept: SURGERY | Facility: HOSPITAL | Age: 64
End: 2019-10-23

## 2019-10-23 VITALS — BODY MASS INDEX: 29.89 KG/M2 | HEIGHT: 66 IN | WEIGHT: 186 LBS

## 2019-10-23 DIAGNOSIS — Z12.39 BREAST CANCER SCREENING: ICD-10-CM

## 2019-10-23 PROCEDURE — 77067 SCR MAMMO BI INCL CAD: CPT | Mod: 26,,, | Performed by: RADIOLOGY

## 2019-10-23 PROCEDURE — 77063 MAMMO DIGITAL SCREENING BILAT WITH TOMOSYNTHESIS_CAD: ICD-10-PCS | Mod: 26,,, | Performed by: RADIOLOGY

## 2019-10-23 PROCEDURE — 77067 MAMMO DIGITAL SCREENING BILAT WITH TOMOSYNTHESIS_CAD: ICD-10-PCS | Mod: 26,,, | Performed by: RADIOLOGY

## 2019-10-23 PROCEDURE — 77063 BREAST TOMOSYNTHESIS BI: CPT | Mod: 26,,, | Performed by: RADIOLOGY

## 2019-10-23 PROCEDURE — 77067 SCR MAMMO BI INCL CAD: CPT | Mod: TC

## 2019-10-23 NOTE — PROGRESS NOTES
Multidisciplinary Conference    63 yo F who presented with acute appendicitis back in 5/21.     Lap appendectomy in the WB by Dr. Asher: purulence in all 4 quadrants and washout also performed.     Path:    T1N0   No LVI  No Gross perforation  Active inflammation and infection.   Size: 17 mm    Colonoscopy after with a polypoid lesion in the appendiceal orifice that was negative for malignancy.     OR: Right Hemicolectomy on 9/18.     Path:     T2N0    Well differentiated adenocarcinoma.   0/23 Nodes.   Size: 2.1 cm. (larger than the 17 mm from the appendix path)    Recommendations:    Unclear if tumor was perforated, there was purulence in all quadrants but the path was not perforated.     Will refer to Dr. Ari Velez to evaluate role for HIPEC.     Oncology: Due to high risk recommend possible Adjuvant Chemotherapy +/- Diagnostic lap after to evaluate role of HIPEC.

## 2019-10-28 ENCOUNTER — TELEPHONE (OUTPATIENT)
Dept: HEMATOLOGY/ONCOLOGY | Facility: CLINIC | Age: 64
End: 2019-10-28

## 2019-10-28 DIAGNOSIS — C18.9 MALIGNANT NEOPLASM OF COLON, UNSPECIFIED PART OF COLON: ICD-10-CM

## 2019-10-28 DIAGNOSIS — C18.1 ADENOCARCINOMA OF APPENDIX: ICD-10-CM

## 2019-10-28 DIAGNOSIS — C18.1 ADENOCARCINOMA OF APPENDIX: Primary | ICD-10-CM

## 2019-10-28 DIAGNOSIS — C18.9 MALIGNANT NEOPLASM OF COLON, UNSPECIFIED PART OF COLON: Primary | ICD-10-CM

## 2019-10-28 DIAGNOSIS — C18.1 MALIGNANT NEOPLASM OF APPENDIX: ICD-10-CM

## 2019-10-28 NOTE — TELEPHONE ENCOUNTER
Pt presented at Multidisciplinary tumor board  Plan CT chest  Plan f/u with Dr. Velez for consideration of HIPEC    LM on pt's phone

## 2019-10-29 ENCOUNTER — TELEPHONE (OUTPATIENT)
Dept: RADIOLOGY | Facility: HOSPITAL | Age: 64
End: 2019-10-29

## 2019-10-29 ENCOUNTER — TELEPHONE (OUTPATIENT)
Dept: ADMINISTRATIVE | Facility: HOSPITAL | Age: 64
End: 2019-10-29

## 2019-10-30 ENCOUNTER — TELEPHONE (OUTPATIENT)
Dept: SURGERY | Facility: CLINIC | Age: 64
End: 2019-10-30

## 2019-10-30 DIAGNOSIS — C18.9 MALIGNANT NEOPLASM OF COLON, UNSPECIFIED PART OF COLON: ICD-10-CM

## 2019-10-30 DIAGNOSIS — C18.1 MALIGNANT NEOPLASM OF APPENDIX: Primary | ICD-10-CM

## 2019-10-31 ENCOUNTER — TELEPHONE (OUTPATIENT)
Dept: SURGERY | Facility: CLINIC | Age: 64
End: 2019-10-31

## 2019-11-01 ENCOUNTER — TELEPHONE (OUTPATIENT)
Dept: SURGERY | Facility: CLINIC | Age: 64
End: 2019-11-01

## 2019-11-05 ENCOUNTER — CLINICAL SUPPORT (OUTPATIENT)
Dept: FAMILY MEDICINE | Facility: CLINIC | Age: 64
End: 2019-11-05
Payer: COMMERCIAL

## 2019-11-05 VITALS — DIASTOLIC BLOOD PRESSURE: 80 MMHG | HEART RATE: 80 BPM | SYSTOLIC BLOOD PRESSURE: 136 MMHG

## 2019-11-05 DIAGNOSIS — I10 ESSENTIAL HYPERTENSION: Primary | ICD-10-CM

## 2019-11-05 PROCEDURE — 99999 PR PBB SHADOW E&M-EST. PATIENT-LVL II: CPT | Mod: PBBFAC,,,

## 2019-11-05 PROCEDURE — 99499 NO LOS: ICD-10-PCS | Mod: S$GLB,,, | Performed by: INTERNAL MEDICINE

## 2019-11-05 PROCEDURE — 99999 PR PBB SHADOW E&M-EST. PATIENT-LVL II: ICD-10-PCS | Mod: PBBFAC,,,

## 2019-11-05 PROCEDURE — 99499 UNLISTED E&M SERVICE: CPT | Mod: S$GLB,,, | Performed by: INTERNAL MEDICINE

## 2019-11-05 NOTE — PROGRESS NOTES
Ny Romeo 64 y.o. female is here today for Blood Pressure check.   History of HTN yes.    Review of patient's allergies indicates:   Allergen Reactions    Penicillins      Creatinine   Date Value Ref Range Status   10/23/2019 0.8 0.5 - 1.4 mg/dL Final     Sodium   Date Value Ref Range Status   10/23/2019 139 136 - 145 mmol/L Final     Potassium   Date Value Ref Range Status   10/23/2019 4.3 3.5 - 5.1 mmol/L Final   ]  Patient verifies taking blood pressure medications on a regular basis at the same time of the day.     Current Outpatient Medications:     amLODIPine (NORVASC) 2.5 MG tablet, Take 1 tablet (2.5 mg total) by mouth once daily., Disp: 30 tablet, Rfl: 11  Does patient have record of home blood pressure readings yes. Readings have been averaging 153/74   Last dose of blood pressure medication was taken at  7 am today  Patient is asymptomatic.       Vitals:    11/05/19 0846   BP: (!) 144/80   BP Location: Right arm   Patient Position: Sitting   BP Method: X-Large (Manual)   Pulse: 80         Dr. Jensen  informed of nurse visit.

## 2019-11-07 DIAGNOSIS — I10 ESSENTIAL HYPERTENSION: Primary | ICD-10-CM

## 2019-11-07 RX ORDER — AMLODIPINE BESYLATE 5 MG/1
5 TABLET ORAL DAILY
Qty: 30 TABLET | Refills: 11 | Status: SHIPPED | OUTPATIENT
Start: 2019-11-07 | End: 2020-11-06

## 2019-11-08 ENCOUNTER — OFFICE VISIT (OUTPATIENT)
Dept: OBSTETRICS AND GYNECOLOGY | Facility: CLINIC | Age: 64
End: 2019-11-08
Payer: COMMERCIAL

## 2019-11-08 ENCOUNTER — TELEPHONE (OUTPATIENT)
Dept: FAMILY MEDICINE | Facility: CLINIC | Age: 64
End: 2019-11-08

## 2019-11-08 VITALS
BODY MASS INDEX: 30.4 KG/M2 | WEIGHT: 189.13 LBS | HEIGHT: 66 IN | SYSTOLIC BLOOD PRESSURE: 138 MMHG | DIASTOLIC BLOOD PRESSURE: 90 MMHG

## 2019-11-08 DIAGNOSIS — Z12.4 CERVICAL CANCER SCREENING: ICD-10-CM

## 2019-11-08 DIAGNOSIS — Z01.419 WELL WOMAN EXAM WITH ROUTINE GYNECOLOGICAL EXAM: Primary | ICD-10-CM

## 2019-11-08 PROCEDURE — 87624 HPV HI-RISK TYP POOLED RSLT: CPT

## 2019-11-08 PROCEDURE — 99999 PR PBB SHADOW E&M-EST. PATIENT-LVL III: ICD-10-PCS | Mod: PBBFAC,,, | Performed by: OBSTETRICS & GYNECOLOGY

## 2019-11-08 PROCEDURE — 88175 CYTOPATH C/V AUTO FLUID REDO: CPT

## 2019-11-08 PROCEDURE — 99999 PR PBB SHADOW E&M-EST. PATIENT-LVL III: CPT | Mod: PBBFAC,,, | Performed by: OBSTETRICS & GYNECOLOGY

## 2019-11-08 PROCEDURE — 99386 PREV VISIT NEW AGE 40-64: CPT | Mod: S$GLB,,, | Performed by: OBSTETRICS & GYNECOLOGY

## 2019-11-08 PROCEDURE — 99386 PR PREVENTIVE VISIT,NEW,40-64: ICD-10-PCS | Mod: S$GLB,,, | Performed by: OBSTETRICS & GYNECOLOGY

## 2019-11-08 NOTE — TELEPHONE ENCOUNTER
Pt returned call regarding medication change as requested by MD pt verbalized understanding schedule pt office visit on 11/25/2019 with PCP

## 2019-11-08 NOTE — TELEPHONE ENCOUNTER
----- Message from Yoandy Jensen MD sent at 11/7/2019 11:23 PM CST -----  Please call patient to advise her to take amlodipine 5 mg daily - that is to complete her current bottle with two of the 2.5 mg pills daily until it is empty, then her next refill will be for 5 mg daily.    Please schedule visit with me in 1-2 weeks for BP check and discuss response.  ----- Message -----  From: Nadia Dominguez LPN  Sent: 11/5/2019   9:08 AM CST  To: Yoandy Jensen MD

## 2019-11-09 PROBLEM — K37 APPENDICITIS: Status: RESOLVED | Noted: 2019-05-20 | Resolved: 2019-11-09

## 2019-11-09 PROBLEM — K35.32 ACUTE PERFORATED APPENDICITIS: Status: RESOLVED | Noted: 2019-05-20 | Resolved: 2019-11-09

## 2019-11-10 NOTE — PROGRESS NOTES
"[facility]  Ambulatory Clinic  Obstetrics & Gynecology    Visit Date:  2019    Chief Complaint:  Annual GYN exam    History of Present Illness:      Ny Romeo is a 64 y.o. , new pt to me, here for a gynecologic exam.      Pt has no major complaints today.      Pt reports an uneventful transition into menopause and is not on hormone replacement therapy.      Pt denies h/o abnormal pap, last pap "long time ago".    Pt denies active sexually transmitted infections.    Last mammo 10/2019 BIRAD 0.    Pt performs monthly self breast examination, non-smoker, uses seat belts, and denies abuse.     Pt had colon resection earlier this year.    Pt denies vaginal bleeding, vaginal discharge, dyspareunia, pelvic pain, bloating, early satiety, unintentional weight loss, breast mass/skin changes, incontinence, GI or urinary complaints.      Otherwise, the pt is in her usual state of health.    Past History:  Gynecologic history as noted above.    Review of Systems:      GENERAL:  No fever, fatigue, excessive weight gain or loss  HEENT:  No headaches, hearing changes, visual disturbance  RESPIRATORY:  No cough, shortness of breath  CARDIOVASCULAR:  No chest pain, heart palpitations, leg swelling  BREAST:  No lump, pain, nipple discharge, skin changes  GASTROINTESTINAL:  No nausea, vomiting, constipation, diarrhea, abd pain, rectal bleeding   GENITOURINARY:  See HPI  ENDOCRINE:  No heat or cold intolerance  HEMATOLOGIC:  No easy bruisability or bleeding   LYMPHATICS:  No enlarged nodes  MUSCULOSKELETAL:  No acute joint pain or swelling  SKIN:  No rash, lesions, jaundice  NEUROLOGIC:  No dizziness, weakness, syncope  PSYCHIATRIC:  No significant mood changes, homicidal/suicidal ideations    Physical Exam:     BP (!) 138/90 (BP Location: Right arm, Patient Position: Sitting)   Ht 5' 6" (1.676 m)   Wt 85.8 kg (189 lb 2.5 oz)   BMI 30.53 kg/m²   Pulse 70, Resp rate 16     GENERAL:  No " acute distress, well-nourished  HEENT:  Atraumatic, anicteric, moist mucus membranes. Neck supple w/o masses.  BREAST:  Symmetric, nontender, no obvious masses, adenopathy, skin changes or nipple discharge.  LUNGS:  Clear to auscultation  HEART:  Regular rate and rhythm, no murmurs, gallops, or rubs  ABDOMEN:  Soft, non-tender, non-distended, normoactive bowel sounds, no obvious organomegaly  EXT:  Symmetric w/o cramping, claudication, or edema. +2 distal pulses.  SKIN:  No rashes or bruising  PSYCH:  Mood and affect appropriate  NEURO:  Grossly intact bilaterally     GENITOURINARY:    VULVAR:  Female external genitalia w/o obvious lesions. Normal urethral meatus. No gross lymphadenopathy.   VAGINA:  Mild, age appropriate vulvovaginal atrophy. No significant cystocele or rectocele. No obvious lesion. No discharge.  CERVIX:  No cervical motion tenderness, discharge, or obvious lesions.   UTERUS:  Small, non-tender, normal contour  ADNEXA:  No masses, non-tender    RECTAL:  Declined. No obvious external lesions    Chaperone present for exam.    Assessment:     64 y.o. :    1. Well woman gynecologic exam    Plan:    A gynecologic health assessment was performed with age appropriate counseling.    Cervical cancer screening - pap obtained.    Screening mammogram up to date, pt advised to follow up radiology regarding follow up screening recommendation in 2-4 wks.  Importance of follow up advised.    Encourage healthy lifestyle modifications, monthly self breast exams, Ca/Vit D, postmenopausal bleeding precautions, yearly mammogram, and colonoscopy.    F/u with PCP for health maintenance.    Return 1 year for gynecologic exam, or sooner as needed.  All questions answered, pt voiced understanding.        Elijah Cruz MD

## 2019-11-13 ENCOUNTER — HOSPITAL ENCOUNTER (OUTPATIENT)
Dept: RADIOLOGY | Facility: HOSPITAL | Age: 64
Discharge: HOME | End: 2019-11-13
Attending: INTERNAL MEDICINE
Payer: COMMERCIAL

## 2019-11-13 ENCOUNTER — TELEPHONE (OUTPATIENT)
Dept: SURGERY | Facility: CLINIC | Age: 64
End: 2019-11-13

## 2019-11-13 DIAGNOSIS — C18.1 ADENOCARCINOMA OF APPENDIX: ICD-10-CM

## 2019-11-13 DIAGNOSIS — C18.9 MALIGNANT NEOPLASM OF COLON, UNSPECIFIED PART OF COLON: ICD-10-CM

## 2019-11-13 DIAGNOSIS — C18.1 MALIGNANT NEOPLASM OF APPENDIX: ICD-10-CM

## 2019-11-13 PROCEDURE — 74177 CT ABD & PELVIS W/CONTRAST: CPT | Mod: 26,,, | Performed by: RADIOLOGY

## 2019-11-13 PROCEDURE — 71260 CT CHEST ABDOMEN PELVIS WITH CONTRAST (XPD): ICD-10-PCS | Mod: 26,,, | Performed by: RADIOLOGY

## 2019-11-13 PROCEDURE — 74177 CT CHEST ABDOMEN PELVIS WITH CONTRAST (XPD): ICD-10-PCS | Mod: 26,,, | Performed by: RADIOLOGY

## 2019-11-13 PROCEDURE — 71260 CT THORAX DX C+: CPT | Mod: 26,,, | Performed by: RADIOLOGY

## 2019-11-13 PROCEDURE — 25500020 PHARM REV CODE 255: Performed by: INTERNAL MEDICINE

## 2019-11-13 PROCEDURE — 74177 CT ABD & PELVIS W/CONTRAST: CPT | Mod: TC

## 2019-11-13 RX ADMIN — IOHEXOL 75 ML: 350 INJECTION, SOLUTION INTRAVENOUS at 01:11

## 2019-11-13 RX ADMIN — IOHEXOL 15 ML: 300 INJECTION, SOLUTION INTRAVENOUS at 01:11

## 2019-11-14 LAB
HPV HR 12 DNA CVX QL NAA+PROBE: NEGATIVE
HPV16 AG SPEC QL: NEGATIVE
HPV18 DNA SPEC QL NAA+PROBE: NEGATIVE

## 2019-11-25 ENCOUNTER — TELEPHONE (OUTPATIENT)
Dept: RADIOLOGY | Facility: HOSPITAL | Age: 64
End: 2019-11-25

## 2019-11-25 ENCOUNTER — OFFICE VISIT (OUTPATIENT)
Dept: FAMILY MEDICINE | Facility: CLINIC | Age: 64
End: 2019-11-25
Payer: COMMERCIAL

## 2019-11-25 VITALS
TEMPERATURE: 99 F | HEIGHT: 66 IN | DIASTOLIC BLOOD PRESSURE: 70 MMHG | SYSTOLIC BLOOD PRESSURE: 130 MMHG | BODY MASS INDEX: 29.97 KG/M2 | RESPIRATION RATE: 17 BRPM | HEART RATE: 78 BPM | OXYGEN SATURATION: 98 % | WEIGHT: 186.5 LBS

## 2019-11-25 DIAGNOSIS — Z28.82 PARENT REFUSES IMMUNIZATIONS: ICD-10-CM

## 2019-11-25 DIAGNOSIS — R92.8 ABNORMAL MAMMOGRAM: ICD-10-CM

## 2019-11-25 DIAGNOSIS — I10 ESSENTIAL HYPERTENSION: ICD-10-CM

## 2019-11-25 DIAGNOSIS — R73.03 PREDIABETES: Primary | ICD-10-CM

## 2019-11-25 DIAGNOSIS — E66.9 OBESITY (BMI 30-39.9): ICD-10-CM

## 2019-11-25 DIAGNOSIS — K76.0 FATTY LIVER: ICD-10-CM

## 2019-11-25 DIAGNOSIS — C18.1 ADENOCARCINOMA OF APPENDIX: ICD-10-CM

## 2019-11-25 PROCEDURE — 99214 PR OFFICE/OUTPT VISIT, EST, LEVL IV, 30-39 MIN: ICD-10-PCS | Mod: S$GLB,,, | Performed by: INTERNAL MEDICINE

## 2019-11-25 PROCEDURE — 99999 PR PBB SHADOW E&M-EST. PATIENT-LVL IV: CPT | Mod: PBBFAC,,, | Performed by: INTERNAL MEDICINE

## 2019-11-25 PROCEDURE — 99999 PR PBB SHADOW E&M-EST. PATIENT-LVL IV: ICD-10-PCS | Mod: PBBFAC,,, | Performed by: INTERNAL MEDICINE

## 2019-11-25 PROCEDURE — 99214 OFFICE O/P EST MOD 30 MIN: CPT | Mod: S$GLB,,, | Performed by: INTERNAL MEDICINE

## 2019-11-25 NOTE — PROGRESS NOTES
HISTORY OF PRESENT ILLNESS:  Ny Romeo is a 64 y.o. female who presents to the clinic today for Blood Pressure Check (htn)    LOV 10/22/19.    HTN  Lowest at work one time was 108/70.  Usually 130s-40s/80s.  Currently on amlodipine 5 mg, reports compliance  She is watching her diet more and trying to do more walking as well.    Back at work at [employer] in the last week    Upcoming hem/onc appt in December.    PAST MEDICAL HISTORY:  Past Medical History:   Diagnosis Date    Acid reflux     Cancer     adenocarcinoma of appendix    Colon cancer        PAST SURGICAL HISTORY:  Past Surgical History:   Procedure Laterality Date     SECTION      CYSTOSCOPY W/ URETERAL STENT PLACEMENT Bilateral 2019    Procedure: CYSTOSCOPY, WITH URETERAL STENT INSERTION;  Surgeon: MAEVE North MD;  Location: [facility] OR;  Service: Urology;  Laterality: Bilateral;  DR NORTH TO CONSENT IN AM    LAPAROSCOPIC APPENDECTOMY N/A 2019    Procedure: APPENDECTOMY, LAPAROSCOPIC;  Surgeon: Pio Castillo III, MD;  Location: [facility] OR;  Service: General;  Laterality: N/A;    LAPAROSCOPIC RIGHT COLON RESECTION Right 2019    Procedure: COLECTOMY, RIGHT, LAPAROSCOPIC;  Surgeon: Pio Castillo III, MD;  Location: [facility] OR;  Service: General;  Laterality: Right;  WITH DR. NORTH  RN PREOP 2019----T/S    TONSILLECTOMY         SOCIAL HISTORY:  Social History     Socioeconomic History    Marital status:      Spouse name: Not on file    Number of children: Not on file    Years of education: Not on file    Highest education level: Not on file   Occupational History    Not on file   Social Needs    Financial resource strain: Not on file    Food insecurity:     Worry: Not on file     Inability: Not on file    Transportation needs:     Medical: Not on file     Non-medical: Not on file   Tobacco Use    Smoking status: Former Smoker     Packs/day: 1.00     Years: 35.00     Pack years: 35.00      Types: Cigarettes     Last attempt to quit: 2017     Years since quittin.8    Smokeless tobacco: Never Used   Substance and Sexual Activity    Alcohol use: Yes     Alcohol/week: 1.0 standard drinks     Types: 1 Cans of beer per week     Frequency: 2-4 times a month     Drinks per session: 1 or 2     Comment: socially    Drug use: No    Sexual activity: Yes     Partners: Male   Lifestyle    Physical activity:     Days per week: Not on file     Minutes per session: Not on file    Stress: Not on file   Relationships    Social connections:     Talks on phone: Not on file     Gets together: Not on file     Attends Muslim service: Not on file     Active member of club or organization: Not on file     Attends meetings of clubs or organizations: Not on file     Relationship status: Not on file   Other Topics Concern    Not on file   Social History Narrative    Not on file       FAMILY HISTORY:  Family History   Problem Relation Age of Onset    Heart disease Mother 60    COPD Mother     Stroke Maternal Grandmother     Heart disease Maternal Grandfather        ALLERGIES AND MEDICATIONS: updated and reviewed.  Review of patient's allergies indicates:   Allergen Reactions    Penicillins      Medication List with Changes/Refills   Current Medications    AMLODIPINE (NORVASC) 5 MG TABLET    Take 1 tablet (5 mg total) by mouth once daily.          CARE TEAM:  Patient Care Team:  Yoandy Jensen MD as PCP - General (Internal Medicine)         REVIEW OF SYSTEMS:  Review of Systems   Constitutional: Negative for chills and fever.   HENT: Negative for congestion and postnasal drip.    Eyes: Negative for photophobia and visual disturbance.   Respiratory: Negative for cough and shortness of breath.    Cardiovascular: Negative for chest pain and palpitations.   Gastrointestinal: Negative for abdominal pain, constipation, diarrhea, nausea and vomiting.   Genitourinary: Negative for dysuria and frequency.    Musculoskeletal: Negative for back pain and neck pain.   Neurological: Negative for light-headedness and headaches.         PHYSICAL EXAM:   Vitals:    11/25/19 1515   BP: 130/70   Pulse: 78   Resp: 17   Temp: 98.6 °F (37 °C)             Body mass index is 30.1 kg/m².     General appearance - alert, well appearing, and in no distress and obese  Mental status - normal mood, behavior, speech, dress, motor activity, and thought processes  Eyes - sclera anicteric, left eye normal, right eye normal  Chest - clear to auscultation, no wheezes, rales or rhonchi, symmetric air entry  Heart - normal rate and regular rhythm  Neurological - alert, oriented, normal speech, no focal findings or movement disorder noted, motor and sensory grossly normal bilaterally  Extremities - no pedal edema noted, intact peripheral pulses      ASSESSMENT AND PLAN:  1. Prediabetes  2. Obesity (BMI 30-39.9)  3. Fatty liver  - Hemoglobin A1c; Future  - Encouraged for continued efforts toward healthy weight through healthy eating and regular exercise.    4. Adenocarcinoma of appendix  - stable, maintain follow up with hem-onc next month    5. Abnormal mammogram  - advised for need to schedule diagnostic mammogram and breast ultrasound to follow up screening MMG abnormality    6. Parent refuses immunizations  - counseled for need of immunizations but she declines at this time.  Says she will consider at follow up in January.     7. Essential hypertension  - fair control on amlodipine. Continue to monitor at home.    Follow up in January or sooner as needed.  Answers for HPI/ROS submitted by the patient on 11/23/2019   Hypertension  Chronicity: recurrent  Onset: more than 1 month ago  Progression since onset: waxing and waning  Condition status: resistant  anxiety: No  blurred vision: No  malaise/fatigue: No  orthopnea: No  peripheral edema: No  PND: No  sweats: No  Agents associated with hypertension: no associated agents  CAD risks: no known risk  factors  Compliance problems: no compliance problems  Past treatments: nothing  Improvement on treatment: no improvement

## 2019-12-12 ENCOUNTER — LAB VISIT (OUTPATIENT)
Dept: LAB | Facility: HOSPITAL | Age: 64
End: 2019-12-12
Attending: INTERNAL MEDICINE
Payer: COMMERCIAL

## 2019-12-12 DIAGNOSIS — C18.9 MALIGNANT NEOPLASM OF COLON, UNSPECIFIED PART OF COLON: ICD-10-CM

## 2019-12-12 DIAGNOSIS — C18.1 ADENOCARCINOMA OF APPENDIX: ICD-10-CM

## 2019-12-12 LAB
ALBUMIN SERPL BCP-MCNC: 4.2 G/DL (ref 3.5–5.2)
ALP SERPL-CCNC: 65 U/L (ref 55–135)
ALT SERPL W/O P-5'-P-CCNC: 30 U/L (ref 10–44)
ANION GAP SERPL CALC-SCNC: 9 MMOL/L (ref 8–16)
AST SERPL-CCNC: 29 U/L (ref 10–40)
BASOPHILS # BLD AUTO: 0.02 K/UL (ref 0–0.2)
BASOPHILS NFR BLD: 0.3 % (ref 0–1.9)
BILIRUB SERPL-MCNC: 0.4 MG/DL (ref 0.1–1)
BUN SERPL-MCNC: 9 MG/DL (ref 8–23)
CALCIUM SERPL-MCNC: 9.5 MG/DL (ref 8.7–10.5)
CHLORIDE SERPL-SCNC: 106 MMOL/L (ref 95–110)
CO2 SERPL-SCNC: 25 MMOL/L (ref 23–29)
CREAT SERPL-MCNC: 0.9 MG/DL (ref 0.5–1.4)
DIFFERENTIAL METHOD: NORMAL
EOSINOPHIL # BLD AUTO: 0.2 K/UL (ref 0–0.5)
EOSINOPHIL NFR BLD: 3.1 % (ref 0–8)
ERYTHROCYTE [DISTWIDTH] IN BLOOD BY AUTOMATED COUNT: 13.4 % (ref 11.5–14.5)
EST. GFR  (AFRICAN AMERICAN): >60 ML/MIN/1.73 M^2
EST. GFR  (NON AFRICAN AMERICAN): >60 ML/MIN/1.73 M^2
GLUCOSE SERPL-MCNC: 120 MG/DL (ref 70–110)
HCT VFR BLD AUTO: 43.6 % (ref 37–48.5)
HGB BLD-MCNC: 14.5 G/DL (ref 12–16)
IMM GRANULOCYTES # BLD AUTO: 0.01 K/UL (ref 0–0.04)
IMM GRANULOCYTES NFR BLD AUTO: 0.2 % (ref 0–0.5)
LYMPHOCYTES # BLD AUTO: 2.7 K/UL (ref 1–4.8)
LYMPHOCYTES NFR BLD: 45.8 % (ref 18–48)
MCH RBC QN AUTO: 29.7 PG (ref 27–31)
MCHC RBC AUTO-ENTMCNC: 33.3 G/DL (ref 32–36)
MCV RBC AUTO: 89 FL (ref 82–98)
MONOCYTES # BLD AUTO: 0.4 K/UL (ref 0.3–1)
MONOCYTES NFR BLD: 6.8 % (ref 4–15)
NEUTROPHILS # BLD AUTO: 2.6 K/UL (ref 1.8–7.7)
NEUTROPHILS NFR BLD: 43.8 % (ref 38–73)
NRBC BLD-RTO: 0 /100 WBC
PLATELET # BLD AUTO: 204 K/UL (ref 150–350)
PMV BLD AUTO: 10.6 FL (ref 9.2–12.9)
POTASSIUM SERPL-SCNC: 4.7 MMOL/L (ref 3.5–5.1)
PROT SERPL-MCNC: 7.3 G/DL (ref 6–8.4)
RBC # BLD AUTO: 4.88 M/UL (ref 4–5.4)
SODIUM SERPL-SCNC: 140 MMOL/L (ref 136–145)
WBC # BLD AUTO: 5.85 K/UL (ref 3.9–12.7)

## 2019-12-12 PROCEDURE — 80053 COMPREHEN METABOLIC PANEL: CPT

## 2019-12-12 PROCEDURE — 85025 COMPLETE CBC W/AUTO DIFF WBC: CPT

## 2019-12-12 PROCEDURE — 36415 COLL VENOUS BLD VENIPUNCTURE: CPT

## 2021-01-04 ENCOUNTER — PATIENT MESSAGE (OUTPATIENT)
Dept: ADMINISTRATIVE | Facility: HOSPITAL | Age: 66
End: 2021-01-04

## 2021-01-27 DIAGNOSIS — Z12.31 OTHER SCREENING MAMMOGRAM: ICD-10-CM

## 2021-01-29 ENCOUNTER — PATIENT MESSAGE (OUTPATIENT)
Dept: ADMINISTRATIVE | Facility: HOSPITAL | Age: 66
End: 2021-01-29

## 2021-02-10 DIAGNOSIS — I10 ESSENTIAL HYPERTENSION: ICD-10-CM

## 2021-04-06 ENCOUNTER — PATIENT MESSAGE (OUTPATIENT)
Dept: ADMINISTRATIVE | Facility: HOSPITAL | Age: 66
End: 2021-04-06

## 2021-04-16 ENCOUNTER — PATIENT MESSAGE (OUTPATIENT)
Dept: RESEARCH | Facility: HOSPITAL | Age: 66
End: 2021-04-16

## 2021-07-07 ENCOUNTER — PATIENT MESSAGE (OUTPATIENT)
Dept: ADMINISTRATIVE | Facility: HOSPITAL | Age: 66
End: 2021-07-07

## 2021-10-04 ENCOUNTER — PATIENT MESSAGE (OUTPATIENT)
Dept: ADMINISTRATIVE | Facility: HOSPITAL | Age: 66
End: 2021-10-04

## 2022-05-31 ENCOUNTER — PATIENT MESSAGE (OUTPATIENT)
Dept: ADMINISTRATIVE | Facility: HOSPITAL | Age: 67
End: 2022-05-31
Payer: COMMERCIAL

## (undated) DEVICE — SEE MEDLINE ITEM 146292

## (undated) DEVICE — DISSECTOR CURVED 5DCD

## (undated) DEVICE — IRRIGATOR ENDOSCOPY DISP.

## (undated) DEVICE — SUT SILK 2-0 SH 18IN BLACK

## (undated) DEVICE — WIRE GUIDE .035 150CM.

## (undated) DEVICE — URETERAL DRAINAGE BAG

## (undated) DEVICE — SEE MEDLINE ITEM 157117

## (undated) DEVICE — CART STAPLE FLEX ETX 3.5MM BLU

## (undated) DEVICE — SCISSOR CURVED ENDOPATH 5MM

## (undated) DEVICE — CUTTER PROXIMATE BLUE 75MM

## (undated) DEVICE — SPONGE LAP 18X18 PREWASHED

## (undated) DEVICE — Device

## (undated) DEVICE — TROCAR ENDOPATH XCEL 11MM 10CM

## (undated) DEVICE — CUTTER PROXIMATE 100MM GRN

## (undated) DEVICE — SUT VICRYL PLUS 3-0 SH 18IN

## (undated) DEVICE — SUPPORT ULNA NERVE PROTECTOR

## (undated) DEVICE — SOL 9P NACL IRR PIC IL

## (undated) DEVICE — SEE MEDLINE ITEM 152622

## (undated) DEVICE — BLANKET UPPER BODY 78.7X29.9IN

## (undated) DEVICE — GOWN B1 X-LG X-LONG

## (undated) DEVICE — SUT SILK 3-0 SH 18IN BLACK

## (undated) DEVICE — SEE MEDLINE ITEM 157150

## (undated) DEVICE — MAT QUICK 40X30 FLOOR FLUID LF

## (undated) DEVICE — SOL SOD CHLORIDE 0.9% 10ML

## (undated) DEVICE — STAPLER SKIN ROTATING HEAD

## (undated) DEVICE — POSITIONER HEAD DONUT 9IN FOAM

## (undated) DEVICE — ELECTRODE REM PLYHSV RETURN 9

## (undated) DEVICE — TRAY FOLEY 16FR INFECTION CONT

## (undated) DEVICE — BAG TISS RETRV MONARCH 10MM

## (undated) DEVICE — SYR 10CC LUER LOCK

## (undated) DEVICE — SUT VICRYL+ 1 CT1 18IN

## (undated) DEVICE — SUT VICRYL+ 27 UR-6 VIOL

## (undated) DEVICE — DRESSING ADH ISLAND 2.5 X 3

## (undated) DEVICE — DRESSING ADHESIVE ISLAND 3 X 6

## (undated) DEVICE — SLEEVE SCD EXPRESS CALF MEDIUM

## (undated) DEVICE — CANISTER SUCTION 2 LTR

## (undated) DEVICE — NDL HYPO REG 25G X 1 1/2

## (undated) DEVICE — KIT ANTIFOG

## (undated) DEVICE — KIT GELPORT LAPAROSCOPIC ABD

## (undated) DEVICE — SUT 0 VICRYL / UR6 (J603)

## (undated) DEVICE — APPLICATOR CHLORAPREP ORN 26ML

## (undated) DEVICE — TROCAR ENDOPATH XCEL 12X100MM

## (undated) DEVICE — SUT ETHILON 3-0 BLK MONO FS

## (undated) DEVICE — COVER SNAP KAP 26IN

## (undated) DEVICE — TROCAR ENDOPATH XCEL 5X100MM

## (undated) DEVICE — SOL NS 1000CC

## (undated) DEVICE — SOL IRR NACL .9% 3000ML

## (undated) DEVICE — SOL CLEARIFY VISUALIZATION LAP

## (undated) DEVICE — STAPLER INT PROX TX 60X3.5MM

## (undated) DEVICE — STAPLER INT LINEAR ARTC 3.5-45

## (undated) DEVICE — STAPLER SKIN PROXIMATE WIDE

## (undated) DEVICE — GLOVE BIOGEL PI MICRO SZ 7

## (undated) DEVICE — DEVICE ENSEAL X1 LARGE JAW

## (undated) DEVICE — RELOAD PROXIMATE CUT BLUE 75MM

## (undated) DEVICE — SEE MEDLINE ITEM 146417

## (undated) DEVICE — SUT SILK 3-0 BLK BR SH 30IN

## (undated) DEVICE — SUT VICRYL 1 CT-1 27 UNDIE

## (undated) DEVICE — PACK ENDOSCOPY GENERAL

## (undated) DEVICE — COVER OVERHEAD SURG LT BLUE

## (undated) DEVICE — JELLY LUBRICANT STERILE 4 OZ

## (undated) DEVICE — SEALER LIGASURE LAP 37CM 5MM

## (undated) DEVICE — SUT ETHILON 2 BLK MONO TP-1

## (undated) DEVICE — BLADE SURG CARBON STEEL SZ11

## (undated) DEVICE — TUBING INSUFFLATION 10

## (undated) DEVICE — TROCAR SPACEMAKER BLUNT 12MM

## (undated) DEVICE — DRESSING ABSRBNT ISLAND 3.6X8

## (undated) DEVICE — CLOSURE SKIN STERI STRIP 1/2X4

## (undated) DEVICE — SUT VICRYL PLUS ANTIBACT

## (undated) DEVICE — CLIPPER BLADE MOD 4406 (CAREF)

## (undated) DEVICE — GLOVE SURGICAL LATEX SZ 7

## (undated) DEVICE — PAD PREP 50/CA

## (undated) DEVICE — GLOVE SURG BIOGEL LATEX SZ 7.5

## (undated) DEVICE — FOAM APP FILM BARRIER NO STING